# Patient Record
Sex: FEMALE | Race: WHITE | NOT HISPANIC OR LATINO | Employment: UNEMPLOYED | URBAN - NONMETROPOLITAN AREA
[De-identification: names, ages, dates, MRNs, and addresses within clinical notes are randomized per-mention and may not be internally consistent; named-entity substitution may affect disease eponyms.]

---

## 2017-07-10 ENCOUNTER — OFFICE VISIT (OUTPATIENT)
Dept: CARDIOLOGY | Facility: CLINIC | Age: 62
End: 2017-07-10

## 2017-07-10 VITALS
HEART RATE: 60 BPM | DIASTOLIC BLOOD PRESSURE: 64 MMHG | SYSTOLIC BLOOD PRESSURE: 114 MMHG | WEIGHT: 174 LBS | BODY MASS INDEX: 29.71 KG/M2 | HEIGHT: 64 IN

## 2017-07-10 DIAGNOSIS — I10 ESSENTIAL HYPERTENSION: ICD-10-CM

## 2017-07-10 DIAGNOSIS — R42 DIZZINESS: ICD-10-CM

## 2017-07-10 DIAGNOSIS — I34.0 NON-RHEUMATIC MITRAL REGURGITATION: ICD-10-CM

## 2017-07-10 DIAGNOSIS — R00.2 PALPITATION: Primary | ICD-10-CM

## 2017-07-10 PROCEDURE — 99213 OFFICE O/P EST LOW 20 MIN: CPT | Performed by: NURSE PRACTITIONER

## 2017-07-10 RX ORDER — RANITIDINE 150 MG/1
150 TABLET ORAL DAILY
COMMUNITY
End: 2020-08-06 | Stop reason: ALTCHOICE

## 2017-07-10 NOTE — PATIENT INSTRUCTIONS
Fat and Cholesterol Restricted Diet  High levels of fat and cholesterol in your blood may lead to various health problems, such as diseases of the heart, blood vessels, gallbladder, liver, and pancreas. Fats are concentrated sources of energy that come in various forms. Certain types of fat, including saturated fat, may be harmful in excess. Cholesterol is a substance needed by your body in small amounts. Your body makes all the cholesterol it needs. Excess cholesterol comes from the food you eat.  When you have high levels of cholesterol and saturated fat in your blood, health problems can develop because the excess fat and cholesterol will gather along the walls of your blood vessels, causing them to narrow. Choosing the right foods will help you control your intake of fat and cholesterol. This will help keep the levels of these substances in your blood within normal limits and reduce your risk of disease.  WHAT IS MY PLAN?  Your health care provider recommends that you:  · Get no more than __________ % of the total calories in your daily diet from fat.  · Limit your intake of saturated fat to less than ______% of your total calories each day.  · Limit the amount of cholesterol in your diet to less than _________mg per day.  WHAT TYPES OF FAT SHOULD I CHOOSE?  · Choose healthy fats more often. Choose monounsaturated and polyunsaturated fats, such as olive and canola oil, flaxseeds, walnuts, almonds, and seeds.  · Eat more omega-3 fats. Good choices include salmon, mackerel, sardines, tuna, flaxseed oil, and ground flaxseeds. Aim to eat fish at least two times a week.  · Limit saturated fats. Saturated fats are primarily found in animal products, such as meats, butter, and cream. Plant sources of saturated fats include palm oil, palm kernel oil, and coconut oil.  · Avoid foods with partially hydrogenated oils in them. These contain trans fats. Examples of foods that contain trans fats are stick margarine, some tub  "margarines, cookies, crackers, and other baked goods.  WHAT GENERAL GUIDELINES DO I NEED TO FOLLOW?  These guidelines for healthy eating will help you control your intake of fat and cholesterol:  · Check food labels carefully to identify foods with trans fats or high amounts of saturated fat.  · Fill one half of your plate with vegetables and green salads.  · Fill one fourth of your plate with whole grains. Look for the word \"whole\" as the first word in the ingredient list.  · Fill one fourth of your plate with lean protein foods.  · Limit fruit to two servings a day. Choose fruit instead of juice.  · Eat more foods that contain soluble fiber. Examples of foods that contain this type of fiber are apples, broccoli, carrots, beans, peas, and barley. Aim to get 20-30 g of fiber per day.  · Eat more home-cooked food and less restaurant, buffet, and fast food.  · Limit or avoid alcohol.  · Limit foods high in starch and sugar.  · Limit fried foods.  · Cook foods using methods other than frying. Baking, boiling, grilling, and broiling are all great options.  · Lose weight if you are overweight. Losing just 5-10% of your initial body weight can help your overall health and prevent diseases such as diabetes and heart disease.  WHAT FOODS CAN I EAT?  Grains  Whole grains, such as whole wheat or whole grain breads, crackers, cereals, and pasta. Unsweetened oatmeal, bulgur, barley, quinoa, or brown rice. Corn or whole wheat flour tortillas.  Vegetables  Fresh or frozen vegetables (raw, steamed, roasted, or grilled). Green salads.  Fruits  All fresh, canned (in natural juice), or frozen fruits.  Meat and Other Protein Products  Ground beef (85% or leaner), grass-fed beef, or beef trimmed of fat. Skinless chicken or turkey. Ground chicken or turkey. Pork trimmed of fat. All fish and seafood. Eggs. Dried beans, peas, or lentils. Unsalted nuts or seeds. Unsalted canned or dry beans.  Dairy  Low-fat dairy products, such as skim or " 1% milk, 2% or reduced-fat cheeses, low-fat ricotta or cottage cheese, or plain low-fat yogurt.  Fats and Oils  Tub margarines without trans fats. Light or reduced-fat mayonnaise and salad dressings. Avocado. Olive, canola, sesame, or safflower oils. Natural peanut or almond butter (choose ones without added sugar and oil).  The items listed above may not be a complete list of recommended foods or beverages. Contact your dietitian for more options.  WHAT FOODS ARE NOT RECOMMENDED?  Grains  White bread. White pasta. White rice. Cornbread. Bagels, pastries, and croissants. Crackers that contain trans fat.  Vegetables  White potatoes. Corn. Creamed or fried vegetables. Vegetables in a cheese sauce.  Fruits  Dried fruits. Canned fruit in light or heavy syrup. Fruit juice.  Meat and Other Protein Products  Fatty cuts of meat. Ribs, chicken wings, hulrey, sausage, bologna, salami, chitterlings, fatback, hot dogs, bratwurst, and packaged luncheon meats. Liver and organ meats.  Dairy  Whole or 2% milk, cream, half-and-half, and cream cheese. Whole milk cheeses. Whole-fat or sweetened yogurt. Full-fat cheeses. Nondairy creamers and whipped toppings. Processed cheese, cheese spreads, or cheese curds.  Sweets and Desserts  Corn syrup, sugars, honey, and molasses. Candy. Jam and jelly. Syrup. Sweetened cereals. Cookies, pies, cakes, donuts, muffins, and ice cream.  Fats and Oils  Butter, stick margarine, lard, shortening, ghee, or hurley fat. Coconut, palm kernel, or palm oils.  Beverages  Alcohol. Sweetened drinks (such as sodas, lemonade, and fruit drinks or punches).  The items listed above may not be a complete list of foods and beverages to avoid. Contact your dietitian for more information.     This information is not intended to replace advice given to you by your health care provider. Make sure you discuss any questions you have with your health care provider.     Document Released: 12/18/2006 Document Revised: 01/08/2016  Document Reviewed: 03/18/2015  [employer] Interactive Patient Education ©2017 [employer]

## 2017-07-10 NOTE — PROGRESS NOTES
"Chief Complaint   Patient presents with   • Follow-up     She states has had some dizziness, feels related to ears or possible virus she was getting.    • Palpitations     She states seems like palpitations are better, she states \"if takes medication does not have as often and not as hard\"   • Med Refill     Needs refills on cardiac medication-90 day.       Cardiac Complaints  Dizziness and palpitations      Subjective   Tawanna Montgomery is a 61 y.o. female with a history of asthma and allergies. She also has a history of palpitations which she has had on and off for the last few years. Holter showed a baseline sinus rhythm with highest rate of 114. Beta blockers were started and she underwent cardiac investigation. Echo showed normal EF, mild LVH, and moderate MR. She returns today for follow up and reports continued palpitations that she says have actually improved from prior.  She did have some dizziness some time back but she relates to a virus she was getting but this has now went away.  Labs she reports with PCP most recent LDL shows labs at 131 with HDL of 54, and vitamin D was low at 23.6, she is now on replacement.  Cardiac refills requested.      Cardiac History  Past Surgical History:   Procedure Laterality Date   • CARDIOVASCULAR STRESS TEST  07/30/2015    Stress-8min, 82%THR, 150/72, negative for ischemia   • CONVERTED (HISTORICAL) HOLTER  02/13/2015    Holter-baseline sinus, HR    • ECHO - CONVERTED  07/30/2015    Echo-Ef 60-65%, moderate MR, RVSP-20mmHg.   • EYE SURGERY         Current Outpatient Prescriptions   Medication Sig Dispense Refill   • Calcium-Magnesium-Vitamin D - MG-MG-UNIT tablet sustained-release 24 hour Take  by mouth.     • diphenhydrAMINE (BENADRYL) 25 mg capsule Take 25 mg by mouth as needed for itching.     • metoprolol tartrate (LOPRESSOR) 25 MG tablet Take 1 tablet by mouth 2 (Two) Times a Day. 180 tablet 3   • raNITIdine (ZANTAC) 150 MG tablet Take 150 mg by " "mouth Daily.       No current facility-administered medications for this visit.        Allegra-d [fexofenadine-pseudoephed er]; Macrobid [nitrofurantoin monohyd macro]; and Penicillins    Past Medical History:   Diagnosis Date   • Asthma    • Depression    • Osteopenia    • Palpitation    • Seasonal allergies    • Vitamin D deficiency        Social History     Social History   • Marital status:      Spouse name: N/A   • Number of children: N/A   • Years of education: N/A     Occupational History   • Not on file.     Social History Main Topics   • Smoking status: Former Smoker     Quit date: 1980   • Smokeless tobacco: Never Used   • Alcohol use No   • Drug use: No   • Sexual activity: Not on file     Other Topics Concern   • Not on file     Social History Narrative       Family History   Problem Relation Age of Onset   • Irregular heart beat Mother    • Prostate cancer Father    • Heart disease Other        Review of Systems   Constitution: Negative for malaise/fatigue and night sweats.   HENT: Negative for congestion and sore throat.    Cardiovascular: Negative for chest pain, dyspnea on exertion and near-syncope.   Respiratory: Negative for cough, shortness of breath and wheezing.    Musculoskeletal: Negative for arthritis and back pain.   Gastrointestinal: Negative for anorexia, flatus, heartburn, nausea and vomiting.   Genitourinary: Negative for dysuria, frequency, hesitancy and nocturia.   Neurological: Negative for dizziness, focal weakness, light-headedness and numbness.   Psychiatric/Behavioral: Negative for altered mental status and depression.       DiabetesNo  Thyroidnormal    Objective     /64 (BP Location: Right arm)  Pulse 60  Ht 64\" (162.6 cm)  Wt 174 lb (78.9 kg)  BMI 29.87 kg/m2    Physical Exam   Constitutional: She is oriented to person, place, and time. She appears well-developed and well-nourished.   HENT:   Head: Normocephalic and atraumatic.   Eyes: EOM are normal. Pupils are " equal, round, and reactive to light.   Neck: Normal range of motion. Neck supple.   Cardiovascular: Normal rate and regular rhythm.    Murmur heard.  Pulmonary/Chest: Effort normal and breath sounds normal.   Abdominal: Soft.   Musculoskeletal: Normal range of motion.   Neurological: She is alert and oriented to person, place, and time.   Skin: Skin is warm and dry.   Psychiatric: She has a normal mood and affect. Her behavior is normal.       Procedures    Assessment/Plan     HR and BP are both stable.  No new cardiac testing will be advised as no new cardiac concerns are voiced.  Cardiac refills sent.  Most recent labs reviewed show LDL of 131, we advised on low cholesterol diet and increasing walking regimen.  She states you will be following in regards.  We will continue follow ups on yearly basis unless problems arise for which she was encouraged to call for sooner appointment.        Problems Addressed this Visit        Cardiovascular and Mediastinum    Palpitation - Primary    HTN (hypertension)    Relevant Medications    metoprolol tartrate (LOPRESSOR) 25 MG tablet    Mitral regurgitation    Relevant Medications    metoprolol tartrate (LOPRESSOR) 25 MG tablet      Other Visit Diagnoses     Dizziness                  Electronically signed by SAUL uLcia July 10, 2017 10:47 AM

## 2018-07-06 NOTE — PROGRESS NOTES
"Chief Complaint   Patient presents with   • Follow-up     For cardiac management. Needs refills on cardiac meds for 90 days to [facility]. Labs per PCP about a year ago.    • Palpitations     Occasional palpitations. About the same as before.    • ASA     Does not take aspirin.        Subjective       Tawanna Montgomery is a 62 y.o. female with a history of asthma and allergies. She also has a history of palpitations which she has had on and off for the last few years. Holter showed a baseline sinus rhythm with highest rate of 114. Beta blockers were started and she underwent cardiac investigation. Echo showed normal EF, mild LVH, and moderate MR.  Today she comes to the office for a follow up visit. No cardiac concerns are voiced. She admits to palpitations, brief in nature, and no worse than before. She does admit to heartburn symptoms especially when \"stomach is empty\".     HPI     Cardiac History:    Past Surgical History:   Procedure Laterality Date   • CARDIOVASCULAR STRESS TEST  07/30/2015    Stress-8min, 82%THR, 150/72, negative for ischemia   • CONVERTED (HISTORICAL) HOLTER  02/13/2015    Holter-baseline sinus, HR    • ECHO - CONVERTED  07/30/2015    Echo-Ef 60-65%, moderate MR, RVSP-20mmHg.   • EYE SURGERY         Current Outpatient Prescriptions   Medication Sig Dispense Refill   • Calcium-Magnesium-Vitamin D - MG-MG-UNIT tablet sustained-release 24 hour Take  by mouth.     • diphenhydrAMINE (BENADRYL) 25 mg capsule Take 25 mg by mouth as needed for itching.     • fluticasone (FLONASE) 50 MCG/ACT nasal spray 2 sprays into each nostril Daily.     • metoprolol tartrate (LOPRESSOR) 25 MG tablet Take 1 tablet by mouth 2 (Two) Times a Day. 180 tablet 4   • raNITIdine (ZANTAC) 150 MG tablet Take 150 mg by mouth Daily.       No current facility-administered medications for this visit.        Allegra-d [fexofenadine-pseudoephed er]; Macrobid [nitrofurantoin monohyd macro]; and " "Penicillins    Past Medical History:   Diagnosis Date   • Asthma    • Depression    • Osteopenia    • Palpitation    • Seasonal allergies    • Vitamin D deficiency        Social History     Social History   • Marital status:      Spouse name: N/A   • Number of children: N/A   • Years of education: N/A     Occupational History   • Not on file.     Social History Main Topics   • Smoking status: Former Smoker     Quit date: 1980   • Smokeless tobacco: Never Used   • Alcohol use No   • Drug use: No   • Sexual activity: Not on file     Other Topics Concern   • Not on file     Social History Narrative   • No narrative on file       Family History   Problem Relation Age of Onset   • Irregular heart beat Mother    • Prostate cancer Father    • Heart disease Other        Review of Systems   Constitution: Negative for decreased appetite, diaphoresis, fever and weakness.   HENT: Negative for congestion, hoarse voice and nosebleeds.    Eyes: Negative for redness and visual disturbance.   Cardiovascular: Positive for palpitations. Negative for chest pain, leg swelling and near-syncope.   Respiratory: Negative for cough, shortness of breath and sleep disturbances due to breathing.    Endocrine: Negative for polydipsia, polyphagia and polyuria.   Hematologic/Lymphatic: Negative for bleeding problem. Does not bruise/bleed easily.   Skin: Negative for dry skin and itching.   Musculoskeletal: Negative for muscle cramps and myalgias.   Gastrointestinal: Positive for heartburn and nausea. Negative for abdominal pain and melena.   Genitourinary: Negative for dysuria and hematuria.   Neurological: Negative for dizziness and light-headedness.   Psychiatric/Behavioral: The patient does not have insomnia and is not nervous/anxious.         Objective     /68   Pulse 60   Ht 162.6 cm (64\")   Wt 79.8 kg (176 lb)   BMI 30.21 kg/m²     Physical Exam   Constitutional: She is oriented to person, place, and time. She appears " well-developed and well-nourished.   HENT:   Head: Normocephalic.   Eyes: Conjunctivae are normal. Pupils are equal, round, and reactive to light.   Neck: Normal range of motion. Neck supple.   Cardiovascular: Normal rate, regular rhythm, S1 normal, S2 normal and normal pulses.    Murmur heard.   Systolic murmur is present with a grade of 2/6   Pulmonary/Chest: Breath sounds normal. She has no wheezes. She has no rales.   Abdominal: Soft. Bowel sounds are normal.   Musculoskeletal: Normal range of motion. She exhibits no edema.   Neurological: She is alert and oriented to person, place, and time.   Skin: Skin is warm and dry.   Psychiatric: She has a normal mood and affect.        Procedures: none today        Assessment/Plan      Tawanna was seen today for follow-up, palpitations and asa.    Diagnoses and all orders for this visit:    Essential hypertension    Non-rheumatic mitral regurgitation    Palpitation    Asthma, unspecified asthma severity, unspecified whether complicated, unspecified whether persistent    Medication management    Other orders  -     metoprolol tartrate (LOPRESSOR) 25 MG tablet; Take 1 tablet by mouth 2 (Two) Times a Day.    Consider reassessment of H.Pylori,, which she has been treated for in the past. She will discuss with you. At this time, advised to continue Zantac. Diet for management of GERD information given to her.     Palpitations are brief and no worse. I did not recommend cardiac workup at this time. She will continue Lopressor twice a day and refills given. If significant palpitations occur she can take extra 1/2 tablet of Lopressor and understands to call the office.     She follows wit you for management of labs. Her insurance has high deductible and co-pays. Therefore, no labs advised at this time.     Patient's Body mass index is 30.21 kg/m². BMI is above normal parameters. Recommendations include: nutrition counseling. Diet for weight loss and heart healthy diet  information given. She management lipids by diet.     We will plan to see her annually. If any problems or concerns develop sooner, she understands to call.             Electronically signed by SAUL Jensen,  July 10, 2018 10:17 AM

## 2018-07-10 ENCOUNTER — OFFICE VISIT (OUTPATIENT)
Dept: CARDIOLOGY | Facility: CLINIC | Age: 63
End: 2018-07-10

## 2018-07-10 VITALS
DIASTOLIC BLOOD PRESSURE: 68 MMHG | SYSTOLIC BLOOD PRESSURE: 110 MMHG | HEART RATE: 60 BPM | HEIGHT: 64 IN | BODY MASS INDEX: 30.05 KG/M2 | WEIGHT: 176 LBS

## 2018-07-10 DIAGNOSIS — I34.0 NON-RHEUMATIC MITRAL REGURGITATION: ICD-10-CM

## 2018-07-10 DIAGNOSIS — R00.2 PALPITATION: ICD-10-CM

## 2018-07-10 DIAGNOSIS — I10 ESSENTIAL HYPERTENSION: Primary | ICD-10-CM

## 2018-07-10 DIAGNOSIS — Z79.899 MEDICATION MANAGEMENT: ICD-10-CM

## 2018-07-10 DIAGNOSIS — J45.909 ASTHMA, UNSPECIFIED ASTHMA SEVERITY, UNSPECIFIED WHETHER COMPLICATED, UNSPECIFIED WHETHER PERSISTENT: ICD-10-CM

## 2018-07-10 PROCEDURE — 99213 OFFICE O/P EST LOW 20 MIN: CPT | Performed by: NURSE PRACTITIONER

## 2018-07-10 RX ORDER — FLUTICASONE PROPIONATE 50 MCG
2 SPRAY, SUSPENSION (ML) NASAL DAILY PRN
COMMUNITY
End: 2023-01-30

## 2018-07-10 NOTE — PATIENT INSTRUCTIONS
Palpitations  A palpitation is the feeling that your heartbeat is irregular or is faster than normal. It may feel like your heart is fluttering or skipping a beat. Palpitations are usually not a serious problem. They may be caused by many things, including smoking, caffeine, alcohol, stress, and certain medicines. Although most causes of palpitations are not serious, palpitations can be a sign of a serious medical problem. In some cases, you may need further medical evaluation.  Follow these instructions at home:  Pay attention to any changes in your symptoms. Take these actions to help with your condition:  · Avoid the following:  ? Caffeinated coffee, tea, soft drinks, diet pills, and energy drinks.  ? Chocolate.  ? Alcohol.  · Do not use any tobacco products, such as cigarettes, chewing tobacco, and e-cigarettes. If you need help quitting, ask your health care provider.  · Try to reduce your stress and anxiety. Things that can help you relax include:  ? Yoga.  ? Meditation.  ? Physical activity, such as swimming, jogging, or walking.  ? Biofeedback. This is a method that helps you learn to use your mind to control things in your body, such as your heartbeats.  · Get plenty of rest and sleep.  · Take over-the-counter and prescription medicines only as told by your health care provider.  · Keep all follow-up visits as told by your health care provider. This is important.    Contact a health care provider if:  · You continue to have a fast or irregular heartbeat after 24 hours.  · Your palpitations occur more often.  Get help right away if:  · You have chest pain or shortness of breath.  · You have a severe headache.  · You feel dizzy or you faint.  This information is not intended to replace advice given to you by your health care provider. Make sure you discuss any questions you have with your health care provider.  Document Released: 12/15/2001 Document Revised: 05/22/2017 Document Reviewed: 09/01/2016  [employer]  Interactive Patient Education © 2018 [employer]    Food Choices for Gastroesophageal Reflux Disease, Adult  When you have gastroesophageal reflux disease (GERD), the foods you eat and your eating habits are very important. Choosing the right foods can help ease the discomfort of GERD. Consider working with a diet and nutrition specialist (dietitian) to help you make healthy food choices.  What general guidelines should I follow?  Eating plan  · Choose healthy foods low in fat, such as fruits, vegetables, whole grains, low-fat dairy products, and lean meat, fish, and poultry.  · Eat frequent, small meals instead of three large meals each day. Eat your meals slowly, in a relaxed setting. Avoid bending over or lying down until 2-3 hours after eating.  · Limit high-fat foods such as fatty meats or fried foods.  · Limit your intake of oils, butter, and shortening to less than 8 teaspoons each day.  · Avoid the following:  ? Foods that cause symptoms. These may be different for different people. Keep a food diary to keep track of foods that cause symptoms.  ? Alcohol.  ? Drinking large amounts of liquid with meals.  ? Eating meals during the 2-3 hours before bed.  · Cook foods using methods other than frying. This may include baking, grilling, or broiling.  Lifestyle    · Maintain a healthy weight. Ask your health care provider what weight is healthy for you. If you need to lose weight, work with your health care provider to do so safely.  · Exercise for at least 30 minutes on 5 or more days each week, or as told by your health care provider.  · Avoid wearing clothes that fit tightly around your waist and chest.  · Do not use any products that contain nicotine or tobacco, such as cigarettes and e-cigarettes. If you need help quitting, ask your health care provider.  · Sleep with the head of your bed raised. Use a wedge under the mattress or blocks under the bed frame to raise the head of the bed.  What foods are not  recommended?  The items listed may not be a complete list. Talk with your dietitian about what dietary choices are best for you.  Grains  Pastries or quick breads with added fat. French toast.  Vegetables  Deep fried vegetables. French fries. Any vegetables prepared with added fat. Any vegetables that cause symptoms. For some people this may include tomatoes and tomato products, chili peppers, onions and garlic, and horseradish.  Fruits  Any fruits prepared with added fat. Any fruits that cause symptoms. For some people this may include citrus fruits, such as oranges, grapefruit, pineapple, and maurilio.  Meats and other protein foods  High-fat meats, such as fatty beef or pork, hot dogs, ribs, ham, sausage, salami and hurley. Fried meat or protein, including fried fish and fried chicken. Nuts and nut butters.  Dairy  Whole milk and chocolate milk. Sour cream. Cream. Ice cream. Cream cheese. Milk shakes.  Beverages  Coffee and tea, with or without caffeine. Carbonated beverages. Sodas. Energy drinks. Fruit juice made with acidic fruits (such as orange or grapefruit). Tomato juice. Alcoholic drinks.  Fats and oils  Butter. Margarine. Shortening. Ghee.  Sweets and desserts  Chocolate and cocoa. Donuts.  Seasoning and other foods  Pepper. Peppermint and spearmint. Any condiments, herbs, or seasonings that cause symptoms. For some people, this may include davalos, hot sauce, or vinegar-based salad dressings.  Summary  · When you have gastroesophageal reflux disease (GERD), food and lifestyle choices are very important to help ease the discomfort of GERD.  · Eat frequent, small meals instead of three large meals each day. Eat your meals slowly, in a relaxed setting. Avoid bending over or lying down until 2-3 hours after eating.  · Limit high-fat foods such as fatty meat or fried foods.  This information is not intended to replace advice given to you by your health care provider. Make sure you discuss any questions you have  with your health care provider.  Document Released: 12/18/2006 Document Revised: 12/19/2017 Document Reviewed: 12/19/2017  [employer] Interactive Patient Education © 2018 [employer]    Calorie Counting for Weight Loss  Calories are units of energy. Your body needs a certain amount of calories from food to keep you going throughout the day. When you eat more calories than your body needs, your body stores the extra calories as fat. When you eat fewer calories than your body needs, your body burns fat to get the energy it needs.  Calorie counting means keeping track of how many calories you eat and drink each day. Calorie counting can be helpful if you need to lose weight. If you make sure to eat fewer calories than your body needs, you should lose weight. Ask your health care provider what a healthy weight is for you.  For calorie counting to work, you will need to eat the right number of calories in a day in order to lose a healthy amount of weight per week. A dietitian can help you determine how many calories you need in a day and will give you suggestions on how to reach your calorie goal.  · A healthy amount of weight to lose per week is usually 1-2 lb (0.5-0.9 kg). This usually means that your daily calorie intake should be reduced by 500-750 calories.  · Eating 1,200 - 1,500 calories per day can help most women lose weight.  · Eating 1,500 - 1,800 calories per day can help most men lose weight.    What is my plan?  My goal is to have __________ calories per day.  If I have this many calories per day, I should lose around __________ pounds per week.  What do I need to know about calorie counting?  In order to meet your daily calorie goal, you will need to:  · Find out how many calories are in each food you would like to eat. Try to do this before you eat.  · Decide how much of the food you plan to eat.  · Write down what you ate and how many calories it had. Doing this is called keeping a food log.    To  successfully lose weight, it is important to balance calorie counting with a healthy lifestyle that includes regular activity. Aim for 150 minutes of moderate exercise (such as walking) or 75 minutes of vigorous exercise (such as running) each week.  Where do I find calorie information?    The number of calories in a food can be found on a Nutrition Facts label. If a food does not have a Nutrition Facts label, try to look up the calories online or ask your dietitian for help.  Remember that calories are listed per serving. If you choose to have more than one serving of a food, you will have to multiply the calories per serving by the amount of servings you plan to eat. For example, the label on a package of bread might say that a serving size is 1 slice and that there are 90 calories in a serving. If you eat 1 slice, you will have eaten 90 calories. If you eat 2 slices, you will have eaten 180 calories.  How do I keep a food log?  Immediately after each meal, record the following information in your food log:  · What you ate. Don't forget to include toppings, sauces, and other extras on the food.  · How much you ate. This can be measured in cups, ounces, or number of items.  · How many calories each food and drink had.  · The total number of calories in the meal.    Keep your food log near you, such as in a small notebook in your pocket, or use a mobile tomeka or website. Some programs will calculate calories for you and show you how many calories you have left for the day to meet your goal.  What are some calorie counting tips?  · Use your calories on foods and drinks that will fill you up and not leave you hungry:  ? Some examples of foods that fill you up are nuts and nut butters, vegetables, lean proteins, and high-fiber foods like whole grains. High-fiber foods are foods with more than 5 g fiber per serving.  ? Drinks such as sodas, specialty coffee drinks, alcohol, and juices have a lot of calories, yet do not  "fill you up.  · Eat nutritious foods and avoid empty calories. Empty calories are calories you get from foods or beverages that do not have many vitamins or protein, such as candy, sweets, and soda. It is better to have a nutritious high-calorie food (such as an avocado) than a food with few nutrients (such as a bag of chips).  · Know how many calories are in the foods you eat most often. This will help you calculate calorie counts faster.  · Pay attention to calories in drinks. Low-calorie drinks include water and unsweetened drinks.  · Pay attention to nutrition labels for \"low fat\" or \"fat free\" foods. These foods sometimes have the same amount of calories or more calories than the full fat versions. They also often have added sugar, starch, or salt, to make up for flavor that was removed with the fat.  · Find a way of tracking calories that works for you. Get creative. Try different apps or programs if writing down calories does not work for you.  What are some portion control tips?  · Know how many calories are in a serving. This will help you know how many servings of a certain food you can have.  · Use a measuring cup to measure serving sizes. You could also try weighing out portions on a kitchen scale. With time, you will be able to estimate serving sizes for some foods.  · Take some time to put servings of different foods on your favorite plates, bowls, and cups so you know what a serving looks like.  · Try not to eat straight from a bag or box. Doing this can lead to overeating. Put the amount you would like to eat in a cup or on a plate to make sure you are eating the right portion.  · Use smaller plates, glasses, and bowls to prevent overeating.  · Try not to multitask (for example, watch TV or use your computer) while eating. If it is time to eat, sit down at a table and enjoy your food. This will help you to know when you are full. It will also help you to be aware of what you are eating and how much " you are eating.  What are tips for following this plan?  Reading food labels  · Check the calorie count compared to the serving size. The serving size may be smaller than what you are used to eating.  · Check the source of the calories. Make sure the food you are eating is high in vitamins and protein and low in saturated and trans fats.  Shopping  · Read nutrition labels while you shop. This will help you make healthy decisions before you decide to purchase your food.  · Make a grocery list and stick to it.  Cooking  · Try to cook your favorite foods in a healthier way. For example, try baking instead of frying.  · Use low-fat dairy products.  Meal planning  · Use more fruits and vegetables. Half of your plate should be fruits and vegetables.  · Include lean proteins like poultry and fish.  How do I count calories when eating out?  · Ask for smaller portion sizes.  · Consider sharing an entree and sides instead of getting your own entree.  · If you get your own entree, eat only half. Ask for a box at the beginning of your meal and put the rest of your entree in it so you are not tempted to eat it.  · If calories are listed on the menu, choose the lower calorie options.  · Choose dishes that include vegetables, fruits, whole grains, low-fat dairy products, and lean protein.  · Choose items that are boiled, broiled, grilled, or steamed. Stay away from items that are buttered, battered, fried, or served with cream sauce. Items labeled “crispy” are usually fried, unless stated otherwise.  · Choose water, low-fat milk, unsweetened iced tea, or other drinks without added sugar. If you want an alcoholic beverage, choose a lower calorie option such as a glass of wine or light beer.  · Ask for dressings, sauces, and syrups on the side. These are usually high in calories, so you should limit the amount you eat.  · If you want a salad, choose a garden salad and ask for grilled meats. Avoid extra toppings like hurley, cheese,  or fried items. Ask for the dressing on the side, or ask for olive oil and vinegar or lemon to use as dressing.  · Estimate how many servings of a food you are given. For example, a serving of cooked rice is ½ cup or about the size of half a baseball. Knowing serving sizes will help you be aware of how much food you are eating at restaurants. The list below tells you how big or small some common portion sizes are based on everyday objects:  ? 1 oz--4 stacked dice.  ? 3 oz--1 deck of cards.  ? 1 tsp--1 die.  ? 1 Tbsp--½ a ping-pong ball.  ? 2 Tbsp--1 ping-pong ball.  ? ½ cup--½ baseball.  ? 1 cup--1 baseball.  Summary  · Calorie counting means keeping track of how many calories you eat and drink each day. If you eat fewer calories than your body needs, you should lose weight.  · A healthy amount of weight to lose per week is usually 1-2 lb (0.5-0.9 kg). This usually means reducing your daily calorie intake by 500-750 calories.  · The number of calories in a food can be found on a Nutrition Facts label. If a food does not have a Nutrition Facts label, try to look up the calories online or ask your dietitian for help.  · Use your calories on foods and drinks that will fill you up, and not on foods and drinks that will leave you hungry.  · Use smaller plates, glasses, and bowls to prevent overeating.  This information is not intended to replace advice given to you by your health care provider. Make sure you discuss any questions you have with your health care provider.  Document Released: 12/18/2006 Document Revised: 11/17/2017 Document Reviewed: 11/17/2017  [employer] Interactive Patient Education © 2018 [employer]

## 2019-08-08 ENCOUNTER — DOCUMENTATION (OUTPATIENT)
Dept: CARDIOLOGY | Facility: CLINIC | Age: 64
End: 2019-08-08

## 2019-08-08 ENCOUNTER — OFFICE VISIT (OUTPATIENT)
Dept: CARDIOLOGY | Facility: CLINIC | Age: 64
End: 2019-08-08

## 2019-08-08 VITALS
SYSTOLIC BLOOD PRESSURE: 110 MMHG | HEIGHT: 64 IN | BODY MASS INDEX: 29.71 KG/M2 | WEIGHT: 174 LBS | DIASTOLIC BLOOD PRESSURE: 68 MMHG | HEART RATE: 60 BPM

## 2019-08-08 DIAGNOSIS — J45.909 ASTHMA, UNSPECIFIED ASTHMA SEVERITY, UNSPECIFIED WHETHER COMPLICATED, UNSPECIFIED WHETHER PERSISTENT: ICD-10-CM

## 2019-08-08 DIAGNOSIS — R00.2 PALPITATION: ICD-10-CM

## 2019-08-08 DIAGNOSIS — I34.0 NON-RHEUMATIC MITRAL REGURGITATION: ICD-10-CM

## 2019-08-08 DIAGNOSIS — E66.3 OVERWEIGHT: ICD-10-CM

## 2019-08-08 DIAGNOSIS — R01.1 HEART MURMUR: ICD-10-CM

## 2019-08-08 DIAGNOSIS — I10 ESSENTIAL HYPERTENSION: Primary | ICD-10-CM

## 2019-08-08 PROCEDURE — 99213 OFFICE O/P EST LOW 20 MIN: CPT | Performed by: NURSE PRACTITIONER

## 2019-08-08 RX ORDER — ERGOCALCIFEROL 1.25 MG/1
50000 CAPSULE ORAL WEEKLY
COMMUNITY
End: 2020-08-06 | Stop reason: ALTCHOICE

## 2019-08-08 RX ORDER — ESCITALOPRAM OXALATE 10 MG/1
10 TABLET ORAL DAILY
COMMUNITY

## 2019-08-08 NOTE — PROGRESS NOTES
"Chief Complaint   Patient presents with   • Follow-up     Cardiac management. Has stable B/P at home, she is now only taking Metoprolol daily instead of BID due to low B/P and HR.   • Palpitations     She states \"fluttering seems to last longer than before\". She has some days with no palpitations and some days more frequent.   • Lab     Has copy of most recent lab.   • Med Refill     Needs refills on Metoprolol-90 day.   • Aspirin     Patient does not take.       Cardiac Complaints  palpitations      Subjective   Tawanna Montgomery is a 64 y.o. female with palpitations, HTN, murmur, allergies, and asthma. Palpitations have been on and off for the last several years. Holter showed a baseline sinus rhythm with highest rate of 114. Beta blockers were started and she underwent cardiac investigation. Echo showed normal EF, mild LVH, and moderate MR.    She returns today for follow up and reports doing well.  She does admit to more palpitations that she states feels like a racing.  Patient reports she does eat a lot of chocolate but is not sure if it related.  She does report the race/flutter sometimes once a day or several times a day that she reports is self limiting and goes away after a few minutes. She does report only taking only taking metoprolol once daily as she does report heart rate getting too low and her blood pressure.  Labs done in February 2019:  H/H 14.3/44.3, AIC 5.4%, Na 143, K 4.3, GLU 95, HDL 50, , TRIG 82, VIT D 27.0, VIT B12 504, ALT 20, AST 20, TSH 2.140.  Refills of metoprolol requested for 90 day supply.        Cardiac History  Past Surgical History:   Procedure Laterality Date   • CARDIOVASCULAR STRESS TEST  07/30/2015    Stress-8min, 82%THR, 150/72, negative for ischemia   • CONVERTED (HISTORICAL) HOLTER  02/13/2015    Holter-baseline sinus, HR    • ECHO - CONVERTED  07/30/2015    Echo-Ef 60-65%, moderate MR, RVSP-20mmHg.   • EYE SURGERY         Current Outpatient Medications "   Medication Sig Dispense Refill   • Calcium-Magnesium-Vitamin D - MG-MG-UNIT tablet sustained-release 24 hour Take  by mouth.     • diphenhydrAMINE (BENADRYL) 25 mg capsule Take 25 mg by mouth as needed for itching.     • escitalopram (LEXAPRO) 10 MG tablet Take 10 mg by mouth Daily.     • fluticasone (FLONASE) 50 MCG/ACT nasal spray 2 sprays into the nostril(s) as directed by provider Daily As Needed.     • metoprolol tartrate (LOPRESSOR) 25 MG tablet 1 tablet in AM and may use additional 1/2 to 1 tablet PM for palpitations 180 tablet 4   • raNITIdine (ZANTAC) 150 MG tablet Take 150 mg by mouth Daily.     • vitamin D (ERGOCALCIFEROL) 38061 units capsule capsule Take 50,000 Units by mouth 1 (One) Time Per Week.       No current facility-administered medications for this visit.        Allegra-d [fexofenadine-pseudoephed er]; Macrobid [nitrofurantoin monohyd macro]; and Penicillins    Past Medical History:   Diagnosis Date   • Asthma    • Depression    • Osteopenia    • Palpitation    • Seasonal allergies    • Vitamin D deficiency        Social History     Socioeconomic History   • Marital status:      Spouse name: Not on file   • Number of children: Not on file   • Years of education: Not on file   • Highest education level: Not on file   Tobacco Use   • Smoking status: Former Smoker     Last attempt to quit:      Years since quittin.6   • Smokeless tobacco: Never Used   Substance and Sexual Activity   • Alcohol use: No   • Drug use: No       Family History   Problem Relation Age of Onset   • Irregular heart beat Mother    • Prostate cancer Father    • Heart disease Other        Review of Systems   Constitution: Negative for weakness and malaise/fatigue.   Cardiovascular: Positive for palpitations. Negative for chest pain, claudication, dyspnea on exertion, irregular heartbeat, leg swelling, near-syncope, orthopnea and syncope.   Respiratory: Negative for cough, shortness of breath and  "wheezing.    Musculoskeletal: Negative for back pain, joint pain and joint swelling.   Gastrointestinal: Negative for anorexia, heartburn, nausea and vomiting.   Genitourinary: Negative for dysuria, hematuria, hesitancy and nocturia.   Neurological: Negative for dizziness, light-headedness and loss of balance.   Psychiatric/Behavioral: Negative for depression and memory loss. The patient is not nervous/anxious.            Objective     /68 (BP Location: Left arm)   Pulse 60   Ht 162.6 cm (64.02\")   Wt 78.9 kg (174 lb)   BMI 29.85 kg/m²     Physical Exam   Constitutional: She is oriented to person, place, and time. She appears well-developed and well-nourished.   HENT:   Head: Normocephalic and atraumatic.   Eyes: EOM are normal. Pupils are equal, round, and reactive to light.   Neck: Normal range of motion. Neck supple.   Cardiovascular: Normal rate and regular rhythm.   Murmur heard.  Pulmonary/Chest: Effort normal and breath sounds normal.   Abdominal: Soft.   Musculoskeletal: Normal range of motion.   Neurological: She is alert and oriented to person, place, and time.   Skin: Skin is warm and dry.   Psychiatric: She has a normal mood and affect. Her behavior is normal.       Procedures    Assessment/Plan     HR is stable today.  Blood pressure as well. HTN well managed on current.  Palpitations have increased since last visit but she has decreased her metoprolol to once daily and admits to more chocolate intake.  Patient was advised to continue with once daily use as she reports lower blood pressure and heart rate at home, she was urged to use an extra 1/2 to 1 tablet as needed for palpitations and to limit caffeine consumption.  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  BMI noted at 29.85, she has lost 2 pounds since last visit.  Most recent labs reviewed with patient showed " low vitamin D which is being replaced by your office.  LDL noted at 116 with HDL of 50. Patient urged on dietary changes and walking regimen.  Patient urged on good cardiac diet with limited caloric intake, carbs, and activity as tolerated advised. 1 year follow up advised or sooner if needed.          Problems Addressed this Visit        Cardiovascular and Mediastinum    Palpitation    HTN (hypertension) - Primary    Relevant Medications    metoprolol tartrate (LOPRESSOR) 25 MG tablet    Mitral regurgitation    Relevant Medications    metoprolol tartrate (LOPRESSOR) 25 MG tablet       Respiratory    Asthma      Other Visit Diagnoses     Heart murmur        Overweight              Patient's Body mass index is 29.85 kg/m². BMI is above normal parameters. Recommendations include: nutrition counseling.                Electronically signed by SAUL Lucia August 9, 2019 9:35 AM

## 2020-08-06 ENCOUNTER — OFFICE VISIT (OUTPATIENT)
Dept: CARDIOLOGY | Facility: CLINIC | Age: 65
End: 2020-08-06

## 2020-08-06 VITALS
BODY MASS INDEX: 29.06 KG/M2 | DIASTOLIC BLOOD PRESSURE: 70 MMHG | HEART RATE: 60 BPM | HEIGHT: 64 IN | SYSTOLIC BLOOD PRESSURE: 110 MMHG | WEIGHT: 170.2 LBS | TEMPERATURE: 98.4 F

## 2020-08-06 DIAGNOSIS — R06.02 SHORTNESS OF BREATH: ICD-10-CM

## 2020-08-06 DIAGNOSIS — I34.0 NONRHEUMATIC MITRAL VALVE REGURGITATION: Primary | ICD-10-CM

## 2020-08-06 DIAGNOSIS — R00.2 PALPITATION: ICD-10-CM

## 2020-08-06 DIAGNOSIS — I10 ESSENTIAL HYPERTENSION: ICD-10-CM

## 2020-08-06 PROCEDURE — 99213 OFFICE O/P EST LOW 20 MIN: CPT | Performed by: NURSE PRACTITIONER

## 2020-08-06 NOTE — PROGRESS NOTES
Chief Complaint   Patient presents with   • Follow-up     Cardiac management.   • Lab     Last labs in July per PCP.   • Palpitations     Has occasional flutter, same as before.   • Med Refill     Needs refills on Metoprolol-90 day.     Subjective       Tawanna Montgomery is a 65 y.o. female with palpitations, HTN, murmur, allergies, and asthma. Palpitations have been on and off for the last several years. Holter showed a baseline sinus rhythm with highest rate of 114. Beta blockers were started and she underwent cardiac investigation. Echo showed normal EF, mild LVH, and moderate MR.    She returns today for her yearly follow-up visit.  She feels well.  Denies chest pain.  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  She does experience mild dyspnea with significant exertion.  No dizziness or syncope.  Palpitations remain intermittent and infrequent.  She is tolerating low-dose metoprolol.  Labs followed by PCP.         Cardiac History:    Past Surgical History:   Procedure Laterality Date   • CARDIOVASCULAR STRESS TEST  07/30/2015    Stress-8min, 82%THR, 150/72, negative for ischemia   • CONVERTED (HISTORICAL) HOLTER  02/13/2015    Holter-baseline sinus, HR    • ECHO - CONVERTED  07/30/2015    Echo-Ef 60-65%, moderate MR, RVSP-20mmHg.   • EYE SURGERY       Current Outpatient Medications   Medication Sig Dispense Refill   • Calcium-Magnesium-Vitamin D - MG-MG-UNIT tablet sustained-release 24 hour Take  by mouth Daily.     • diphenhydrAMINE (BENADRYL) 25 mg capsule Take 25 mg by mouth as needed for itching.     • escitalopram (LEXAPRO) 10 MG tablet Take 10 mg by mouth Daily As Needed.     • fluticasone (FLONASE) 50 MCG/ACT nasal spray 2 sprays into the nostril(s) as directed by provider Daily As Needed.     • metoprolol tartrate (LOPRESSOR) 25 MG tablet 1 tablet in AM and may use additional 1/2 to 1 tablet PM for palpitations 180 tablet 4     No  "current facility-administered medications for this visit.      Allegra-d [fexofenadine-pseudoephed er]; Macrobid [nitrofurantoin monohyd macro]; and Penicillins    Past Medical History:   Diagnosis Date   • Asthma    • Depression    • Hx of cholecystectomy    • Osteopenia    • Palpitation    • Seasonal allergies    • Vitamin D deficiency      Social History     Socioeconomic History   • Marital status:      Spouse name: Not on file   • Number of children: Not on file   • Years of education: Not on file   • Highest education level: Not on file   Tobacco Use   • Smoking status: Former Smoker     Last attempt to quit: 1980     Years since quittin.6   • Smokeless tobacco: Never Used   Substance and Sexual Activity   • Alcohol use: No   • Drug use: No     Family History   Problem Relation Age of Onset   • Irregular heart beat Mother    • Prostate cancer Father    • Heart disease Other      Review of Systems   Constitution: Negative for decreased appetite and malaise/fatigue.   HENT: Negative.    Eyes: Negative for blurred vision.   Cardiovascular: Positive for dyspnea on exertion and palpitations. Negative for chest pain, leg swelling and syncope.   Respiratory: Negative for shortness of breath and sleep disturbances due to breathing.    Endocrine: Negative.    Hematologic/Lymphatic: Negative for bleeding problem. Does not bruise/bleed easily.   Skin: Negative.    Musculoskeletal: Negative for falls and myalgias.   Gastrointestinal: Negative for abdominal pain, heartburn and melena.   Genitourinary: Negative for hematuria.   Neurological: Negative for dizziness and light-headedness.   Psychiatric/Behavioral: Negative for altered mental status.   Allergic/Immunologic: Negative.       Objective     /70 (BP Location: Right arm)   Pulse 60   Temp 98.4 °F (36.9 °C)   Ht 162.6 cm (64.02\")   Wt 77.2 kg (170 lb 3.2 oz)   BMI 29.20 kg/m²     Physical Exam   Constitutional: She is oriented to person, place, " and time. She appears well-developed and well-nourished. No distress.   HENT:   Head: Normocephalic.   Eyes: Pupils are equal, round, and reactive to light.   Neck: Normal range of motion.   Cardiovascular: Normal rate, regular rhythm, S1 normal, S2 normal and intact distal pulses.   Murmur heard.   Systolic murmur is present with a grade of 2/6.  Pulmonary/Chest: Effort normal and breath sounds normal. No respiratory distress.   Abdominal: Soft. Bowel sounds are normal.   Musculoskeletal: Normal range of motion. She exhibits no edema.   Neurological: She is alert and oriented to person, place, and time.   Skin: Skin is warm and dry. She is not diaphoretic.   Psychiatric: She has a normal mood and affect.   Nursing note and vitals reviewed.     Procedures          Problem List Items Addressed This Visit        Cardiovascular and Mediastinum    Palpitation    Overview     Well-controlled with metoprolol.  Continue the same         HTN (hypertension)    Overview     Well-controlled with low-dose beta-blocker.  Continue the same.  Low-sodium.  Weight loss for BMI closer to 25.         Relevant Medications    metoprolol tartrate (LOPRESSOR) 25 MG tablet    Mitral regurgitation - Primary    Overview     Moderate MR by echocardiogram in 2015.  She has mild dyspnea on exertion.  Will repeat her echocardiogram for routine surveillance.         Relevant Medications    metoprolol tartrate (LOPRESSOR) 25 MG tablet    Other Relevant Orders    Adult Transthoracic Echo Complete W/ Cont if Necessary Per Protocol      Other Visit Diagnoses     Shortness of breath        Relevant Orders    Adult Transthoracic Echo Complete W/ Cont if Necessary Per Protocol         Further recommendations to follow echocardiogram.  Refill sent for metoprolol.  Heart healthy diet regular exercise recommended.  Follow-up in 1 year or sooner if needed.    Patient's Body mass index is 29.2 kg/m². BMI is above normal parameters. Recommendations include:  nutrition counseling.               Electronically signed by SAUL Mendez,  August 6, 2020 11:40

## 2020-09-21 ENCOUNTER — HOSPITAL ENCOUNTER (OUTPATIENT)
Dept: CARDIOLOGY | Facility: HOSPITAL | Age: 65
Discharge: HOME | End: 2020-09-21
Admitting: NURSE PRACTITIONER

## 2020-09-21 DIAGNOSIS — R06.02 SHORTNESS OF BREATH: ICD-10-CM

## 2020-09-21 DIAGNOSIS — I34.0 NONRHEUMATIC MITRAL VALVE REGURGITATION: ICD-10-CM

## 2020-09-21 LAB
BH CV ECHO MEAS - ACS: 1.9 CM
BH CV ECHO MEAS - AO MAX PG: 7.7 MMHG
BH CV ECHO MEAS - AO MEAN PG: 4 MMHG
BH CV ECHO MEAS - AO ROOT AREA (BSA CORRECTED): 1.4
BH CV ECHO MEAS - AO ROOT AREA: 5.3 CM^2
BH CV ECHO MEAS - AO ROOT DIAM: 2.6 CM
BH CV ECHO MEAS - AO V2 MAX: 139 CM/SEC
BH CV ECHO MEAS - AO V2 MEAN: 95.6 CM/SEC
BH CV ECHO MEAS - AO V2 VTI: 34.6 CM
BH CV ECHO MEAS - BSA(HAYCOCK): 1.9 M^2
BH CV ECHO MEAS - BSA: 1.8 M^2
BH CV ECHO MEAS - BZI_BMI: 29.2 KILOGRAMS/M^2
BH CV ECHO MEAS - BZI_METRIC_HEIGHT: 162.6 CM
BH CV ECHO MEAS - BZI_METRIC_WEIGHT: 77.1 KG
BH CV ECHO MEAS - EDV(CUBED): 61.2 ML
BH CV ECHO MEAS - EDV(MOD-SP4): 78 ML
BH CV ECHO MEAS - EDV(TEICH): 67.5 ML
BH CV ECHO MEAS - EF(CUBED): 49.4 %
BH CV ECHO MEAS - EF(MOD-SP4): 76 %
BH CV ECHO MEAS - EF(TEICH): 42.1 %
BH CV ECHO MEAS - ESV(CUBED): 31 ML
BH CV ECHO MEAS - ESV(MOD-SP4): 18.7 ML
BH CV ECHO MEAS - ESV(TEICH): 39.1 ML
BH CV ECHO MEAS - FS: 20.3 %
BH CV ECHO MEAS - IVS/LVPW: 1.2
BH CV ECHO MEAS - IVSD: 0.99 CM
BH CV ECHO MEAS - LA DIMENSION: 3.7 CM
BH CV ECHO MEAS - LA/AO: 1.4
BH CV ECHO MEAS - LV DIASTOLIC VOL/BSA (35-75): 42.7 ML/M^2
BH CV ECHO MEAS - LV IVRT: 0.1 SEC
BH CV ECHO MEAS - LV MASS(C)D: 109.7 GRAMS
BH CV ECHO MEAS - LV MASS(C)DI: 60.1 GRAMS/M^2
BH CV ECHO MEAS - LV SYSTOLIC VOL/BSA (12-30): 10.2 ML/M^2
BH CV ECHO MEAS - LVIDD: 3.9 CM
BH CV ECHO MEAS - LVIDS: 3.1 CM
BH CV ECHO MEAS - LVLD AP4: 8.5 CM
BH CV ECHO MEAS - LVLS AP4: 5.9 CM
BH CV ECHO MEAS - LVOT AREA (M): 2.5 CM^2
BH CV ECHO MEAS - LVOT AREA: 2.5 CM^2
BH CV ECHO MEAS - LVOT DIAM: 1.8 CM
BH CV ECHO MEAS - LVPWD: 0.84 CM
BH CV ECHO MEAS - MV A MAX VEL: 123 CM/SEC
BH CV ECHO MEAS - MV DEC SLOPE: 348 CM/SEC^2
BH CV ECHO MEAS - MV E MAX VEL: 103 CM/SEC
BH CV ECHO MEAS - MV E/A: 0.84
BH CV ECHO MEAS - RVDD: 2.5 CM
BH CV ECHO MEAS - SI(AO): 100.6 ML/M^2
BH CV ECHO MEAS - SI(CUBED): 16.5 ML/M^2
BH CV ECHO MEAS - SI(MOD-SP4): 32.5 ML/M^2
BH CV ECHO MEAS - SI(TEICH): 15.6 ML/M^2
BH CV ECHO MEAS - SV(AO): 183.7 ML
BH CV ECHO MEAS - SV(CUBED): 30.2 ML
BH CV ECHO MEAS - SV(MOD-SP4): 59.3 ML
BH CV ECHO MEAS - SV(TEICH): 28.4 ML
MAXIMAL PREDICTED HEART RATE: 154 BPM
STRESS TARGET HR: 131 BPM

## 2020-09-21 PROCEDURE — 93306 TTE W/DOPPLER COMPLETE: CPT | Performed by: INTERNAL MEDICINE

## 2020-09-21 PROCEDURE — 93306 TTE W/DOPPLER COMPLETE: CPT

## 2021-08-05 ENCOUNTER — OFFICE VISIT (OUTPATIENT)
Dept: CARDIOLOGY | Facility: CLINIC | Age: 66
End: 2021-08-05

## 2021-08-05 VITALS
WEIGHT: 178.2 LBS | HEART RATE: 60 BPM | DIASTOLIC BLOOD PRESSURE: 70 MMHG | SYSTOLIC BLOOD PRESSURE: 138 MMHG | HEIGHT: 64 IN | BODY MASS INDEX: 30.42 KG/M2

## 2021-08-05 DIAGNOSIS — R60.0 BILATERAL LEG EDEMA: ICD-10-CM

## 2021-08-05 DIAGNOSIS — E66.9 OBESITY (BMI 30.0-34.9): ICD-10-CM

## 2021-08-05 DIAGNOSIS — J45.909 ASTHMA, UNSPECIFIED ASTHMA SEVERITY, UNSPECIFIED WHETHER COMPLICATED, UNSPECIFIED WHETHER PERSISTENT: ICD-10-CM

## 2021-08-05 DIAGNOSIS — I34.0 NONRHEUMATIC MITRAL VALVE REGURGITATION: ICD-10-CM

## 2021-08-05 DIAGNOSIS — R00.2 PALPITATION: ICD-10-CM

## 2021-08-05 DIAGNOSIS — I10 ESSENTIAL HYPERTENSION: Primary | ICD-10-CM

## 2021-08-05 PROCEDURE — 99213 OFFICE O/P EST LOW 20 MIN: CPT | Performed by: NURSE PRACTITIONER

## 2021-08-05 NOTE — PROGRESS NOTES
Chief Complaint   Patient presents with   • Follow-up     Cardiac management   • Lab     Has copy of most recent labs.   • Edema     Has occasional swelling in legs, relieved with elevation.   • Med Refill     Needs refills on Metoprolol-90 day.       Cardiac Complaints  lower extremity edema      Subjective   Tawanna Montgomery is a 66 y.o. female with palpitations, HTN, murmur, allergies, and asthma. Palpitations have been on and off for the last several years. Holter showed a baseline sinus rhythm with highest rate of 114. Beta blockers were started and she underwent cardiac investigation. Echo showed normal EF, mild LVH, and moderate MR.     She returns today for her yearly follow-up visit.  She feels well.  Denies chest pain.  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 She does report some edema on occasion, but admits with elevation, edema resolves. Labs brought with her today from Feb 2021 showed:  HH 14.6/43.7, BUN 15, Creatinine 0.83, GFR 74, Na 142, K 4.7, AST 22, ALT 24, AIC 5.3%, TSH 1.810, VIT D 27.4.  Refills of metoprolol requested.           Cardiac History  Past Surgical History:   Procedure Laterality Date   • CARDIOVASCULAR STRESS TEST  07/30/2015    Stress-8min, 82%THR, 150/72, negative for ischemia   • CONVERTED (HISTORICAL) HOLTER  02/13/2015    Holter-baseline sinus, HR    • ECHO - CONVERTED  07/30/2015    Echo-Ef 60-65%, moderate MR, RVSP-20mmHg.   • ECHO - CONVERTED  09/21/2020    EF 60%. LA- 3.7 Cm. Mild MR   • EYE SURGERY         Current Outpatient Medications   Medication Sig Dispense Refill   • Calcium-Magnesium-Vitamin D - MG-MG-UNIT tablet sustained-release 24 hour Take  by mouth Daily As Needed.     • diphenhydrAMINE (BENADRYL) 25 mg capsule Take 25 mg by mouth as needed for itching.     • escitalopram (LEXAPRO) 10 MG tablet Take 5 mg by mouth Daily.     • fluticasone (FLONASE) 50 MCG/ACT nasal spray 2 sprays into  the nostril(s) as directed by provider Daily As Needed.     • metoprolol tartrate (LOPRESSOR) 25 MG tablet 1 tablet in AM and may use additional 1/2 to 1 tablet PM for palpitations 180 tablet 4     No current facility-administered medications for this visit.       Allegra-d [fexofenadine-pseudoephed er], Macrobid [nitrofurantoin monohyd macro], and Penicillins    Past Medical History:   Diagnosis Date   • Asthma    • Depression    • Hx of cholecystectomy    • Osteopenia    • Palpitation    • Seasonal allergies    • Vitamin D deficiency        Social History     Socioeconomic History   • Marital status:      Spouse name: Not on file   • Number of children: Not on file   • Years of education: Not on file   • Highest education level: Not on file   Tobacco Use   • Smoking status: Former Smoker     Quit date:      Years since quittin.6   • Smokeless tobacco: Never Used   Vaping Use   • Vaping Use: Never used   Substance and Sexual Activity   • Alcohol use: No   • Drug use: No       Family History   Problem Relation Age of Onset   • Irregular heart beat Mother    • Prostate cancer Father    • Heart disease Other        Review of Systems   Constitutional: Negative for malaise/fatigue and night sweats.   Cardiovascular: Positive for leg swelling. Negative for chest pain, claudication, dyspnea on exertion, irregular heartbeat, near-syncope, orthopnea, palpitations and syncope.   Respiratory: Negative for cough, shortness of breath and wheezing.    Musculoskeletal: Positive for stiffness. Negative for back pain and joint pain.   Gastrointestinal: Negative for anorexia, heartburn, melena, nausea and vomiting.   Genitourinary: Negative for dysuria, hematuria, hesitancy and nocturia.   Neurological: Negative for dizziness, light-headedness and loss of balance.   Psychiatric/Behavioral: Negative for depression and memory loss. The patient is not nervous/anxious.            Objective     /70 (BP Location:  "Right arm)   Pulse 60   Ht 162.6 cm (64.02\")   Wt 80.8 kg (178 lb 3.2 oz)   BMI 30.57 kg/m²     Constitutional:       Appearance: Not in distress.   Eyes:      Pupils: Pupils are equal, round, and reactive to light.   HENT:      Nose: Nose normal.   Pulmonary:      Effort: Pulmonary effort is normal.      Breath sounds: Normal breath sounds.   Cardiovascular:      PMI at left midclavicular line. Normal rate. Regular rhythm.      Murmurs: There is a systolic murmur.   Edema:     Peripheral edema present.  Abdominal:      Palpations: Abdomen is soft.   Musculoskeletal: Normal range of motion.      Cervical back: Normal range of motion and neck supple. Skin:     General: Skin is warm and dry.   Neurological:      Mental Status: Oriented to person, place and time.         Procedures    Assessment/Plan     Palpitations:  Well managed on current. Same metoprolol therapy continued.     HTN:  Blood pressure stable, but SBP slightly high, patient urged on log at home, and to limit sodium intake. Same BB therapy advised.     Edema:  Limited sodium and leg elevation advised. She will call if symptoms should worsen, so prn diuretic may be used.    Refills per request.    Cardiac status:  Stable. No new concerns voiced. No repeat workup advised.     BMI noted at 30.57, good cardiac diet with limited carbs, calories, and walking regimen advised.     6 month follow up scheduled or sooner if needed.       Problems Addressed this Visit        Cardiac and Vasculature    Palpitation    HTN (hypertension) - Primary    Relevant Medications    metoprolol tartrate (LOPRESSOR) 25 MG tablet    Mitral regurgitation    Relevant Medications    metoprolol tartrate (LOPRESSOR) 25 MG tablet       Pulmonary and Pneumonias    Asthma      Other Visit Diagnoses     Bilateral leg edema        Obesity (BMI 30.0-34.9)          Diagnoses       Codes Comments    Essential hypertension    -  Primary ICD-10-CM: I10  ICD-9-CM: 401.9     Palpitation     " ICD-10-CM: R00.2  ICD-9-CM: 785.1     Nonrheumatic mitral valve regurgitation     ICD-10-CM: I34.0  ICD-9-CM: 424.0     Bilateral leg edema     ICD-10-CM: R60.0  ICD-9-CM: 782.3     Asthma, unspecified asthma severity, unspecified whether complicated, unspecified whether persistent     ICD-10-CM: J45.909  ICD-9-CM: 493.90     Obesity (BMI 30.0-34.9)     ICD-10-CM: E66.9  ICD-9-CM: 278.00           Patient's Body mass index is 30.57 kg/m². indicating that she is obese. Good cardiac diet with limited carbs, calories, and activity as tolerated advised.           Electronically signed by SAUL Lucia August 5, 2021 12:33 EDT

## 2022-07-19 ENCOUNTER — OFFICE VISIT (OUTPATIENT)
Dept: CARDIOLOGY | Facility: CLINIC | Age: 67
End: 2022-07-19

## 2022-07-19 VITALS
HEART RATE: 68 BPM | DIASTOLIC BLOOD PRESSURE: 82 MMHG | BODY MASS INDEX: 30.05 KG/M2 | SYSTOLIC BLOOD PRESSURE: 142 MMHG | HEIGHT: 64 IN | WEIGHT: 176 LBS

## 2022-07-19 DIAGNOSIS — I34.0 NONRHEUMATIC MITRAL VALVE REGURGITATION: ICD-10-CM

## 2022-07-19 DIAGNOSIS — F43.21 GRIEF: ICD-10-CM

## 2022-07-19 DIAGNOSIS — I10 PRIMARY HYPERTENSION: Primary | ICD-10-CM

## 2022-07-19 DIAGNOSIS — R60.0 EDEMA LEG: ICD-10-CM

## 2022-07-19 DIAGNOSIS — R00.2 PALPITATION: ICD-10-CM

## 2022-07-19 DIAGNOSIS — E66.9 OBESITY (BMI 30.0-34.9): ICD-10-CM

## 2022-07-19 PROCEDURE — 99213 OFFICE O/P EST LOW 20 MIN: CPT | Performed by: NURSE PRACTITIONER

## 2022-07-19 RX ORDER — MELOXICAM 15 MG/1
15 TABLET ORAL DAILY
COMMUNITY

## 2022-07-19 NOTE — PROGRESS NOTES
Chief Complaint   Patient presents with   • Follow-up     For cardiac management. Patient is not on aspirin. Last lab work was done in January 2022 per PCP, not in chart. States that she has had some palpitations.    • Med Refill     Needs refills on metoprolol. 90 day supplies to [facility]. Patient went over medications verbally.        Cardiac Complaints  palpitations      Subjective   Tawanna Montgomery is a 66 y.o. female with palpitations, HTN, murmur, allergies, and asthma. Palpitations have been on and off for the last several years. Holter showed a baseline sinus rhythm with highest rate of 114. Beta blockers were started and she underwent cardiac investigation. Echo showed normal EF, mild LVH, and moderate MR.    She remains active at home without concerns. She does have some rare palpitations, but denies any CP, SOA, dizziness, or syncope. Left knee pain noted today, she is to see ortho soon in regards. Labs with PCP, most recently done in Jan, no copy available. Refills requested.       Cardiac History  Past Surgical History:   Procedure Laterality Date   • CARDIOVASCULAR STRESS TEST  07/30/2015    Stress-8min, 82%THR, 150/72, negative for ischemia   • CONVERTED (HISTORICAL) HOLTER  02/13/2015    Holter-baseline sinus, HR    • ECHO - CONVERTED  07/30/2015    Echo-Ef 60-65%, moderate MR, RVSP-20mmHg.   • ECHO - CONVERTED  09/21/2020    EF 60%. LA- 3.7 Cm. Mild MR   • EYE SURGERY         Current Outpatient Medications   Medication Sig Dispense Refill   • Calcium-Magnesium-Vitamin D - MG-MG-UNIT tablet sustained-release 24 hour Take 1 tablet by mouth Daily As Needed.     • diphenhydrAMINE (BENADRYL) 25 mg capsule Take 25 mg by mouth as needed for itching.     • escitalopram (LEXAPRO) 10 MG tablet Take 10 mg by mouth Daily.     • fluticasone (FLONASE) 50 MCG/ACT nasal spray 2 sprays into the nostril(s) as directed by provider Daily As Needed.     • meloxicam (MOBIC) 15 MG tablet Take 15 mg by  "mouth Daily.     • metoprolol tartrate (LOPRESSOR) 25 MG tablet 1 tablet in AM and may use additional 1/2 to 1 tablet PM for palpitations 180 tablet 4     No current facility-administered medications for this visit.       Allegra-d [fexofenadine-pseudoephed er], Macrobid [nitrofurantoin monohyd macro], and Penicillins    Past Medical History:   Diagnosis Date   • Asthma    • Depression    • Hx of cholecystectomy    • Osteopenia    • Palpitation    • Seasonal allergies    • Vitamin D deficiency        Social History     Socioeconomic History   • Marital status:    Tobacco Use   • Smoking status: Former Smoker     Quit date:      Years since quittin.5   • Smokeless tobacco: Never Used   Vaping Use   • Vaping Use: Never used   Substance and Sexual Activity   • Alcohol use: No   • Drug use: No       Family History   Problem Relation Age of Onset   • Irregular heart beat Mother    • Prostate cancer Father    • Heart disease Other        Review of Systems   Constitutional: Negative for malaise/fatigue and night sweats.   Cardiovascular: Negative for chest pain, claudication, dyspnea on exertion, irregular heartbeat, leg swelling, near-syncope, orthopnea, palpitations and syncope.   Respiratory: Negative for cough, shortness of breath and wheezing.    Musculoskeletal: Negative for back pain, joint pain and stiffness.   Gastrointestinal: Negative for anorexia, heartburn, melena, nausea and vomiting.   Genitourinary: Negative for dysuria, hematuria, hesitancy and nocturia.   Neurological: Negative for dizziness, headaches and light-headedness.   Psychiatric/Behavioral: Positive for depression. Negative for memory loss. The patient is nervous/anxious.            Objective     /82 (BP Location: Left arm)   Pulse 68   Ht 162.6 cm (64.02\")   Wt 79.8 kg (176 lb)   BMI 30.20 kg/m²     Constitutional:       Appearance: Not in distress.   Eyes:      Pupils: Pupils are equal, round, and reactive to light. "   HENT:      Nose: Nose normal.   Pulmonary:      Effort: Pulmonary effort is normal.      Breath sounds: Normal breath sounds.   Cardiovascular:      PMI at left midclavicular line. Normal rate. Regular rhythm.      Murmurs: There is a systolic murmur.   Abdominal:      Palpations: Abdomen is soft.   Musculoskeletal: Normal range of motion.      Cervical back: Normal range of motion and neck supple. Skin:     General: Skin is warm and dry.   Neurological:      Mental Status: Alert and oriented to person, place and time.         Procedures         Diagnoses and all orders for this visit:    1. Primary hypertension (Primary)    2. Palpitation    3. Nonrheumatic mitral valve regurgitation    4. Edema leg    5. Grief    6. Obesity (BMI 30.0-34.9)    Other orders  -     metoprolol tartrate (LOPRESSOR) 25 MG tablet; 1 tablet in AM and may use additional 1/2 to 1 tablet PM for palpitations  Dispense: 180 tablet; Refill: 4           Palpitations: Managed with beta blocker therapy. She admits palpitations worse over last two months after death of her . She was urged to continue with BB in AM and extra 1/2 PM. Limited caffeine advised.      HTN:  Blood pressure stable, but mildly elevated. She was urged increase in BB to 1 tablet AM and 1/2 to 1 tablet PM.     Edema:  Improved. She will call if symptoms should worsen, so prn diuretic may be used. Limited sodium advised.     Cardiac status:  Stable. We discussed repeat workup since length of time since last workup, she would like to wait to for a bit. If she should need clearance for knee surgery, she will call so further workup may be scheduled.     Refills per request.    Condolences given on the recent death of her .      BMI noted at 30.20, good cardiac diet with limited carbs, calories, and walking regimen advised.      6 month follow up scheduled or sooner if needed.          Problems Addressed this Visit        Cardiac and Vasculature    Palpitation     HTN (hypertension) - Primary    Relevant Medications    metoprolol tartrate (LOPRESSOR) 25 MG tablet    Mitral regurgitation    Relevant Medications    metoprolol tartrate (LOPRESSOR) 25 MG tablet      Other Visit Diagnoses     Edema leg        Grief        Obesity (BMI 30.0-34.9)          Diagnoses       Codes Comments    Primary hypertension    -  Primary ICD-10-CM: I10  ICD-9-CM: 401.9     Palpitation     ICD-10-CM: R00.2  ICD-9-CM: 785.1     Nonrheumatic mitral valve regurgitation     ICD-10-CM: I34.0  ICD-9-CM: 424.0     Edema leg     ICD-10-CM: R60.0  ICD-9-CM: 782.3     Grief     ICD-10-CM: F43.21  ICD-9-CM: 309.0     Obesity (BMI 30.0-34.9)     ICD-10-CM: E66.9  ICD-9-CM: 278.00                 Electronically signed by Ev Howell, SAUL July 19, 2022 14:20 EDT

## 2023-01-30 ENCOUNTER — OFFICE VISIT (OUTPATIENT)
Dept: CARDIOLOGY | Facility: CLINIC | Age: 68
End: 2023-01-30
Payer: MEDICARE

## 2023-01-30 VITALS
SYSTOLIC BLOOD PRESSURE: 122 MMHG | WEIGHT: 184.4 LBS | HEIGHT: 64 IN | DIASTOLIC BLOOD PRESSURE: 72 MMHG | HEART RATE: 56 BPM | BODY MASS INDEX: 31.48 KG/M2

## 2023-01-30 DIAGNOSIS — I10 PRIMARY HYPERTENSION: ICD-10-CM

## 2023-01-30 DIAGNOSIS — R00.2 PALPITATION: Primary | ICD-10-CM

## 2023-01-30 DIAGNOSIS — E66.9 OBESITY (BMI 30.0-34.9): ICD-10-CM

## 2023-01-30 DIAGNOSIS — E78.00 ELEVATED LDL CHOLESTEROL LEVEL: ICD-10-CM

## 2023-01-30 PROCEDURE — 99214 OFFICE O/P EST MOD 30 MIN: CPT | Performed by: NURSE PRACTITIONER

## 2023-01-30 RX ORDER — PANTOPRAZOLE SODIUM 40 MG/1
40 TABLET, DELAYED RELEASE ORAL DAILY
COMMUNITY
End: 2023-01-30 | Stop reason: SDUPTHER

## 2023-01-30 RX ORDER — ERGOCALCIFEROL 1.25 MG/1
50000 CAPSULE ORAL WEEKLY
COMMUNITY

## 2023-01-30 RX ORDER — PANTOPRAZOLE SODIUM 40 MG/1
40 TABLET, DELAYED RELEASE ORAL DAILY
Qty: 90 TABLET | Refills: 2 | Status: SHIPPED | OUTPATIENT
Start: 2023-01-30

## 2023-01-30 NOTE — PROGRESS NOTES
Chief Complaint   Patient presents with   • Follow-up     Pt is here for cardiac follow up.  Pt reports a few palpitations since her LOV.  She denies CP, dizziness or SOB.  Pt does not take a daily ASA.     • Med Refill     Pt request 90 day refills to be [facility].  Medications were verified by med bottles.     • Lab Work     Pt states their last labs were 1/20/23- pt brought in copy.         Cardiac Complaints  palpitations      Subjective   Tawanna Montgomery is a 67 y.o. female with palpitations, HTN, murmur, allergies, and asthma. Palpitations have been on and off for the last several years. Holter showed a baseline sinus rhythm with highest rate of 114. Beta blockers were started and she underwent cardiac investigation. Echo showed normal EF, mild LVH, and moderate MR.  At last visit in 2022, she had been having more anxiety after the loss of her  and slightly more palpitations noted.     She returns today for follow up and admits she has been doing better emotionally after the death of her . She does admit to rare palpitations, but states only happen on rare occasion. She denies any CP, dizziness, SOA, or syncope. Labs done with PCP on 1/2023 showed: HH 14.4/42.8, BUN 14, Creatinine 0.86, Na 144, K 4.4, AST 22, ALT 16, TRIG 82, HDL 52, , TSH 2.530, VIT D 28.6, now on VIT D replacement. Refills requested.         Cardiac History  Past Surgical History:   Procedure Laterality Date   • CARDIOVASCULAR STRESS TEST  07/30/2015    Stress-8min, 82%THR, 150/72, negative for ischemia   • CONVERTED (HISTORICAL) HOLTER  02/13/2015    Holter-baseline sinus, HR    • ECHO - CONVERTED  07/30/2015    Echo-Ef 60-65%, moderate MR, RVSP-20mmHg.   • ECHO - CONVERTED  09/21/2020    EF 60%. LA- 3.7 Cm. Mild MR   • EYE SURGERY         Current Outpatient Medications   Medication Sig Dispense Refill   • escitalopram (LEXAPRO) 10 MG tablet Take 10 mg by mouth Daily.     • meloxicam (MOBIC) 15 MG tablet  Take 15 mg by mouth Daily.     • metoprolol tartrate (LOPRESSOR) 25 MG tablet 1 tablet in AM and may use additional 1/2 to 1 tablet PM for palpitations 180 tablet 2   • pantoprazole (PROTONIX) 40 MG EC tablet Take 1 tablet by mouth Daily. 90 tablet 2   • vitamin D (ERGOCALCIFEROL) 1.25 MG (19134 UT) capsule capsule Take 50,000 Units by mouth 1 (One) Time Per Week.       No current facility-administered medications for this visit.       Allegra-d [fexofenadine-pseudoephed er], Macrobid [nitrofurantoin monohyd macro], and Penicillins    Past Medical History:   Diagnosis Date   • Asthma    • Depression    • Hx of cholecystectomy    • Osteopenia    • Palpitation    • Seasonal allergies    • Vitamin D deficiency        Social History     Socioeconomic History   • Marital status:    Tobacco Use   • Smoking status: Former     Types: Cigarettes     Quit date:      Years since quittin.1   • Smokeless tobacco: Never   Vaping Use   • Vaping Use: Never used   Substance and Sexual Activity   • Alcohol use: No   • Drug use: No       Family History   Problem Relation Age of Onset   • Irregular heart beat Mother    • Prostate cancer Father    • Heart disease Other        Review of Systems   Constitutional: Negative for malaise/fatigue and night sweats.   Cardiovascular: Positive for palpitations. Negative for chest pain, claudication, dyspnea on exertion, irregular heartbeat, leg swelling, near-syncope, orthopnea and syncope.   Respiratory: Negative for cough, shortness of breath and wheezing.    Musculoskeletal: Positive for stiffness. Negative for back pain and joint pain.   Gastrointestinal: Negative for anorexia, heartburn, nausea and vomiting.   Genitourinary: Negative for dysuria, hematuria, hesitancy and nocturia.   Neurological: Negative for dizziness, headaches and light-headedness.   Psychiatric/Behavioral: Negative for depression and memory loss. The patient is not nervous/anxious.            Objective  "    /72 (BP Location: Left arm, Patient Position: Sitting)   Pulse 56   Ht 162.6 cm (64\")   Wt 83.6 kg (184 lb 6.4 oz)   BMI 31.65 kg/m²     Constitutional:       Appearance: Not in distress.   Eyes:      Pupils: Pupils are equal, round, and reactive to light.   HENT:      Nose: Nose normal.   Pulmonary:      Effort: Pulmonary effort is normal.      Breath sounds: Normal breath sounds.   Cardiovascular:      PMI at left midclavicular line. Bradycardia present. Regular rhythm.      Murmurs: There is a systolic murmur.   Abdominal:      Palpations: Abdomen is soft.   Musculoskeletal: Normal range of motion.      Cervical back: Normal range of motion and neck supple. Skin:     General: Skin is warm and dry.   Neurological:      Mental Status: Alert.         Procedures         Diagnoses and all orders for this visit:    1. Palpitation (Primary)    2. Primary hypertension    3. Elevated LDL cholesterol level    4. Obesity (BMI 30.0-34.9)    Other orders  -     metoprolol tartrate (LOPRESSOR) 25 MG tablet; 1 tablet in AM and may use additional 1/2 to 1 tablet PM for palpitations  Dispense: 180 tablet; Refill: 2  -     pantoprazole (PROTONIX) 40 MG EC tablet; Take 1 tablet by mouth Daily.  Dispense: 90 tablet; Refill: 2             Palpitations: Managed with beta blocker therapy. She admits palpitations still noted rarely, but improved from prior. She was urged to continue with BB in AM and extra 1/2 PM. Limited caffeine advised.      HTN:  Blood pressure stable, continue lopressor at the same. Limited sodium urged.      Cardiac status:  Stable. No repeat workup will be recommended. If symptoms should occur, she will call so testing may be advised.     Hyperlipidemia: LDL elevated at 125. Dietary changes urged, along with red yeast rice and apple cider vinegar encouraged.      Refills per request.     BMI noted at 31.65, good cardiac diet with limited carbs, calories, and walking regimen advised.      6 month " follow up scheduled or sooner if needed.           Electronically signed by SAUL Lucia January 30, 2023 12:15 EST

## 2023-01-30 NOTE — LETTER
January 30, 2023     Tawanna Bustillo MD  [address] [facility] [address]    Patient: Tawanna Montgomery   YOB: 1955   Date of Visit: 1/30/2023       Dear Tawanna Bustillo MD    Tawanna Montgomery was in my office today. Below is a copy of my note.    If you have questions, please do not hesitate to call me. I look forward to following Tawanna along with you.         Sincerely,        Ev Howell, SAUL        CC: No Recipients    Chief Complaint   Patient presents with   • Follow-up     Pt is here for cardiac follow up.  Pt reports a few palpitations since her LOV.  She denies CP, dizziness or SOB.  Pt does not take a daily ASA.     • Med Refill     Pt request 90 day refills to be sent to [facility].  Medications were verified by med bottles.     • Lab Work     Pt states their last labs were 1/20/23- pt brought in copy.         Cardiac Complaints  palpitations      Subjective    Tawanna Montgomery is a 67 y.o. female with palpitations, HTN, murmur, allergies, and asthma. Palpitations have been on and off for the last several years. Holter showed a baseline sinus rhythm with highest rate of 114. Beta blockers were started and she underwent cardiac investigation. Echo showed normal EF, mild LVH, and moderate MR.  At last visit in 2022, she had been having more anxiety after the loss of her  and slightly more palpitations noted.     She returns today for follow up and admits she has been doing better emotionally after the death of her . She does admit to rare palpitations, but states only happen on rare occasion. She denies any CP, dizziness, SOA, or syncope. Labs done with PCP on 1/2023 showed: HH 14.4/42.8, BUN 14, Creatinine 0.86, Na 144, K 4.4, AST 22, ALT 16, TRIG 82, HDL 52, , TSH 2.530, VIT D 28.6, now on VIT D replacement. Refills requested.         Cardiac History  Past Surgical History:   Procedure Laterality Date   • CARDIOVASCULAR STRESS TEST  07/30/2015     Stress-8min, 82%THR, 150/72, negative for ischemia   • CONVERTED (HISTORICAL) HOLTER  2015    Holter-baseline sinus, HR    • ECHO - CONVERTED  2015    Echo-Ef 60-65%, moderate MR, RVSP-20mmHg.   • ECHO - CONVERTED  2020    EF 60%. LA- 3.7 Cm. Mild MR   • EYE SURGERY         Current Outpatient Medications   Medication Sig Dispense Refill   • escitalopram (LEXAPRO) 10 MG tablet Take 10 mg by mouth Daily.     • meloxicam (MOBIC) 15 MG tablet Take 15 mg by mouth Daily.     • metoprolol tartrate (LOPRESSOR) 25 MG tablet 1 tablet in AM and may use additional 1/2 to 1 tablet PM for palpitations 180 tablet 2   • pantoprazole (PROTONIX) 40 MG EC tablet Take 1 tablet by mouth Daily. 90 tablet 2   • vitamin D (ERGOCALCIFEROL) 1.25 MG (00485 UT) capsule capsule Take 50,000 Units by mouth 1 (One) Time Per Week.       No current facility-administered medications for this visit.       Allegra-d [fexofenadine-pseudoephed er], Macrobid [nitrofurantoin monohyd macro], and Penicillins    Past Medical History:   Diagnosis Date   • Asthma    • Depression    • Hx of cholecystectomy    • Osteopenia    • Palpitation    • Seasonal allergies    • Vitamin D deficiency        Social History     Socioeconomic History   • Marital status:    Tobacco Use   • Smoking status: Former     Types: Cigarettes     Quit date:      Years since quittin.1   • Smokeless tobacco: Never   Vaping Use   • Vaping Use: Never used   Substance and Sexual Activity   • Alcohol use: No   • Drug use: No       Family History   Problem Relation Age of Onset   • Irregular heart beat Mother    • Prostate cancer Father    • Heart disease Other        Review of Systems   Constitutional: Negative for malaise/fatigue and night sweats.   Cardiovascular: Positive for palpitations. Negative for chest pain, claudication, dyspnea on exertion, irregular heartbeat, leg swelling, near-syncope, orthopnea and syncope.   Respiratory: Negative for  "cough, shortness of breath and wheezing.    Musculoskeletal: Positive for stiffness. Negative for back pain and joint pain.   Gastrointestinal: Negative for anorexia, heartburn, nausea and vomiting.   Genitourinary: Negative for dysuria, hematuria, hesitancy and nocturia.   Neurological: Negative for dizziness, headaches and light-headedness.   Psychiatric/Behavioral: Negative for depression and memory loss. The patient is not nervous/anxious.            Objective      /72 (BP Location: Left arm, Patient Position: Sitting)   Pulse 56   Ht 162.6 cm (64\")   Wt 83.6 kg (184 lb 6.4 oz)   BMI 31.65 kg/m²     Constitutional:       Appearance: Not in distress.   Eyes:      Pupils: Pupils are equal, round, and reactive to light.   HENT:      Nose: Nose normal.   Pulmonary:      Effort: Pulmonary effort is normal.      Breath sounds: Normal breath sounds.   Cardiovascular:      PMI at left midclavicular line. Bradycardia present. Regular rhythm.      Murmurs: There is a systolic murmur.   Abdominal:      Palpations: Abdomen is soft.   Musculoskeletal: Normal range of motion.      Cervical back: Normal range of motion and neck supple. Skin:     General: Skin is warm and dry.   Neurological:      Mental Status: Alert.         Procedures        Diagnoses and all orders for this visit:    1. Palpitation (Primary)    2. Primary hypertension    3. Elevated LDL cholesterol level    4. Obesity (BMI 30.0-34.9)    Other orders  -     metoprolol tartrate (LOPRESSOR) 25 MG tablet; 1 tablet in AM and may use additional 1/2 to 1 tablet PM for palpitations  Dispense: 180 tablet; Refill: 2  -     pantoprazole (PROTONIX) 40 MG EC tablet; Take 1 tablet by mouth Daily.  Dispense: 90 tablet; Refill: 2            Palpitations: Managed with beta blocker therapy. She admits palpitations still noted rarely, but improved from prior. She was urged to continue with BB in AM and extra 1/2 PM. Limited caffeine advised.      HTN:  Blood " pressure stable, continue lopressor at the same. Limited sodium urged.      Cardiac status:  Stable. No repeat workup will be recommended. If symptoms should occur, she will call so testing may be advised.     Hyperlipidemia: LDL elevated at 125. Dietary changes urged, along with red yeast rice and apple cider vinegar encouraged.      Refills per request.     BMI noted at 31.65, good cardiac diet with limited carbs, calories, and walking regimen advised.      6 month follow up scheduled or sooner if needed.           Electronically signed by SAUL Lucia January 30, 2023 12:15 EST

## 2023-03-23 ENCOUNTER — TELEPHONE (OUTPATIENT)
Dept: CARDIOLOGY | Facility: CLINIC | Age: 68
End: 2023-03-23
Payer: MEDICARE

## 2023-03-23 NOTE — TELEPHONE ENCOUNTER
Received fax from Dr. Joseph for cardiac clearance for patient to have a left total knee arthroplasty. According to our records, I do not see where patient is on any blood thinners or has had any stenting.       Fax [phone]  Attn: Swathi

## 2023-08-07 ENCOUNTER — OFFICE VISIT (OUTPATIENT)
Dept: CARDIOLOGY | Facility: CLINIC | Age: 68
End: 2023-08-07
Payer: MEDICARE

## 2023-08-07 VITALS
SYSTOLIC BLOOD PRESSURE: 130 MMHG | HEART RATE: 64 BPM | BODY MASS INDEX: 31.65 KG/M2 | DIASTOLIC BLOOD PRESSURE: 70 MMHG | WEIGHT: 185.4 LBS | HEIGHT: 64 IN

## 2023-08-07 DIAGNOSIS — I34.0 NONRHEUMATIC MITRAL VALVE REGURGITATION: ICD-10-CM

## 2023-08-07 DIAGNOSIS — J45.909 ASTHMA, UNSPECIFIED ASTHMA SEVERITY, UNSPECIFIED WHETHER COMPLICATED, UNSPECIFIED WHETHER PERSISTENT: ICD-10-CM

## 2023-08-07 DIAGNOSIS — I10 PRIMARY HYPERTENSION: ICD-10-CM

## 2023-08-07 DIAGNOSIS — R00.2 PALPITATION: Primary | ICD-10-CM

## 2023-08-07 PROCEDURE — 1160F RVW MEDS BY RX/DR IN RCRD: CPT | Performed by: NURSE PRACTITIONER

## 2023-08-07 PROCEDURE — 99213 OFFICE O/P EST LOW 20 MIN: CPT | Performed by: NURSE PRACTITIONER

## 2023-08-07 PROCEDURE — 3078F DIAST BP <80 MM HG: CPT | Performed by: NURSE PRACTITIONER

## 2023-08-07 PROCEDURE — 1159F MED LIST DOCD IN RCRD: CPT | Performed by: NURSE PRACTITIONER

## 2023-08-07 PROCEDURE — 3075F SYST BP GE 130 - 139MM HG: CPT | Performed by: NURSE PRACTITIONER

## 2023-08-07 NOTE — PROGRESS NOTES
Chief Complaint   Patient presents with    Follow-up     Cardiac management       Subjective       Tawanna Montgomery is a 68 y.o. female with palpitations, HTN, murmur, allergies, and asthma. Palpitations have been on and off for the last several years. Holter showed a baseline sinus rhythm with highest rate of 114. Beta blockers were started and she underwent cardiac investigation. Echo showed normal EF, mild LVH, and moderate MR.     In March 2023 she underwent knee replacement surgery without complication.     Today she returns to the office for follow-up visit.  She has completed physical therapy and now maintaining her normal daily activity that issue.  Blood pressures been stable.  She admits palpitations have subsided since the beginning of the year.  Most often the winter months are when she is more symptomatic most likely attributed to stress and anxiety.    Cardiac History:    Past Surgical History:   Procedure Laterality Date    CARDIOVASCULAR STRESS TEST  07/30/2015    Stress-8min, 82%THR, 150/72, negative for ischemia    CONVERTED (HISTORICAL) HOLTER  02/13/2015    Holter-baseline sinus, HR     ECHO - CONVERTED  07/30/2015    Echo-Ef 60-65%, moderate MR, RVSP-20mmHg.    ECHO - CONVERTED  09/21/2020    EF 60%. LA- 3.7 Cm. Mild MR    EYE SURGERY         Current Outpatient Medications   Medication Sig Dispense Refill    escitalopram (LEXAPRO) 10 MG tablet Take 10 mg by mouth Daily.      meloxicam (MOBIC) 15 MG tablet Take 15 mg by mouth Daily.      metoprolol tartrate (LOPRESSOR) 25 MG tablet 1 tablet in AM and may use additional 1/2 to 1 tablet PM for palpitations 180 tablet 2    pantoprazole (PROTONIX) 40 MG EC tablet Take 1 tablet by mouth Daily. 90 tablet 2    vitamin D (ERGOCALCIFEROL) 1.25 MG (71035 UT) capsule capsule Take 50,000 Units by mouth 1 (One) Time Per Week.       No current facility-administered medications for this visit.       Allegra-d [fexofenadine-pseudoephed er], Macrobid  "[nitrofurantoin monohyd macro], and Penicillins    Past Medical History:   Diagnosis Date    Asthma     Depression     Hx of cholecystectomy     Osteopenia     Palpitation     Seasonal allergies     Vitamin D deficiency        Social History     Socioeconomic History    Marital status:    Tobacco Use    Smoking status: Former     Types: Cigarettes     Quit date:      Years since quittin.6    Smokeless tobacco: Never   Vaping Use    Vaping Use: Never used   Substance and Sexual Activity    Alcohol use: No    Drug use: No       Family History   Problem Relation Age of Onset    Irregular heart beat Mother     Prostate cancer Father     Heart disease Other        Review of Systems   Cardiovascular:  Negative for chest pain, dyspnea on exertion, leg swelling, near-syncope, palpitations and paroxysmal nocturnal dyspnea.   Respiratory:  Negative for shortness of breath and sleep disturbances due to breathing.    Skin:  Negative for color change.   Musculoskeletal:  Positive for joint pain.   Gastrointestinal:  Negative for change in bowel habit.   Genitourinary:  Negative for dysuria and hematuria.   Neurological:  Negative for dizziness and weakness.   Psychiatric/Behavioral:  Negative for suicidal ideas. The patient is nervous/anxious.       BP Readings from Last 5 Encounters:   23 122/72   22 142/82   21 138/70   20 110/70   19 110/68       Wt Readings from Last 5 Encounters:   23 83.6 kg (184 lb 6.4 oz)   22 79.8 kg (176 lb)   21 80.8 kg (178 lb 3.2 oz)   20 77.2 kg (170 lb 3.2 oz)   19 78.9 kg (174 lb)       Objective     2023: HH 14.4/42.8, BUN 14, Creatinine 0.86, Na 144, K 4.4, AST 22, ALT 16, TRIG 82, HDL 52, , TSH 2.530, VIT D 28.6,     Ht 162.6 cm (64\")   BMI 31.65 kg/mý     Vitals and nursing note reviewed.   Constitutional:       Appearance: Healthy appearance. Not in distress.   Eyes:      Conjunctiva/sclera: Conjunctivae " normal.      Pupils: Pupils are equal, round, and reactive to light.   HENT:      Head: Normocephalic.   Pulmonary:      Effort: Pulmonary effort is normal.      Breath sounds: Normal breath sounds.   Cardiovascular:      PMI at left midclavicular line. Normal rate. Regular rhythm.      Murmurs: There is no murmur.   Edema:     Peripheral edema absent.   Abdominal:      General: Bowel sounds are normal.      Palpations: Abdomen is soft.   Musculoskeletal:      Cervical back: Normal range of motion and neck supple. Skin:     General: Skin is warm and dry.   Neurological:      Mental Status: Alert, oriented to person, place, and time and oriented to person, place and time.        Procedures: None today         Assessment & Plan   Diagnoses and all orders for this visit:    1. Palpitation (Primary)    2. Primary hypertension    3. Nonrheumatic mitral valve regurgitation    4. Asthma, unspecified asthma severity, unspecified whether complicated, unspecified whether persistent      Hypertension/palpitations  -BP controlled  -Heart rate and rhythm normal  -Currently palpitations denied  -Informational handout on palpitations and triggers to avoid provided  -Continue Lopressor 1 tablet in the morning and one half in the evening.  Can take 1/2 tablet as needed for palpitations    Hyperlipidemia  -Labs per your office, please copy recent results.    Cardiac murmur  -Clinically stable  -No repeat testing warranted    BMI 31  -Diet and weight loss efforts with goal BMI goal around 25 encouraged.    Follow up in 6months, if continues to do well will consider annual visits after.

## 2024-03-28 ENCOUNTER — OFFICE VISIT (OUTPATIENT)
Dept: CARDIOLOGY | Facility: CLINIC | Age: 69
End: 2024-03-28
Payer: MEDICARE

## 2024-03-28 VITALS
DIASTOLIC BLOOD PRESSURE: 72 MMHG | HEART RATE: 64 BPM | HEIGHT: 64 IN | SYSTOLIC BLOOD PRESSURE: 142 MMHG | WEIGHT: 191.2 LBS | BODY MASS INDEX: 32.64 KG/M2

## 2024-03-28 DIAGNOSIS — E66.9 OBESITY (BMI 30.0-34.9): ICD-10-CM

## 2024-03-28 DIAGNOSIS — I10 PRIMARY HYPERTENSION: Primary | ICD-10-CM

## 2024-03-28 DIAGNOSIS — I34.0 NONRHEUMATIC MITRAL VALVE REGURGITATION: ICD-10-CM

## 2024-03-28 DIAGNOSIS — R07.89 CHEST PRESSURE: ICD-10-CM

## 2024-03-28 DIAGNOSIS — E78.00 ELEVATED LDL CHOLESTEROL LEVEL: ICD-10-CM

## 2024-03-28 PROCEDURE — 3077F SYST BP >= 140 MM HG: CPT | Performed by: NURSE PRACTITIONER

## 2024-03-28 PROCEDURE — 99214 OFFICE O/P EST MOD 30 MIN: CPT | Performed by: NURSE PRACTITIONER

## 2024-03-28 PROCEDURE — 1160F RVW MEDS BY RX/DR IN RCRD: CPT | Performed by: NURSE PRACTITIONER

## 2024-03-28 PROCEDURE — 3078F DIAST BP <80 MM HG: CPT | Performed by: NURSE PRACTITIONER

## 2024-03-28 PROCEDURE — 1159F MED LIST DOCD IN RCRD: CPT | Performed by: NURSE PRACTITIONER

## 2024-03-28 RX ORDER — ERGOCALCIFEROL 1.25 MG/1
CAPSULE ORAL
Qty: 6 CAPSULE | Refills: 3 | Status: SHIPPED | OUTPATIENT
Start: 2024-03-28

## 2024-03-28 RX ORDER — CLINDAMYCIN HYDROCHLORIDE 300 MG/1
300 CAPSULE ORAL 3 TIMES DAILY
COMMUNITY

## 2024-03-28 NOTE — PROGRESS NOTES
"Chief Complaint   Patient presents with    Follow-up     Cardiac management . Has  random episodes of chest pressure  \" feel like bra too tight \"    LABS     Has copy of current labs 3-3-2024 in door    Med Refill     No refills needed today. PCP filled last prescriptions        Subjective       Tawanna Montgomery is a 68 y.o. female with palpitations, HTN, murmur, allergies, and asthma. Palpitations have been on and off for the last several years. Holter showed a baseline sinus rhythm with highest rate of 114. Beta blockers were started and she underwent cardiac investigation. Echo showed normal EF, mild LVH, and moderate MR.      In March 2023 she underwent knee replacement surgery without complication.     Today she returns to the office for follow-up visit.  She admits to being more sedentary post knee surgery and during the winter months.  Sometimes while sitting she experience pressure in lower chest/epigastric area that feels \"like a bra was too tight\".  No recent change in medication management noted.  She denies heartburn.    Cardiac History:    Past Surgical History:   Procedure Laterality Date    CARDIOVASCULAR STRESS TEST  07/30/2015    Stress-8min, 82%THR, 150/72, negative for ischemia    CONVERTED (HISTORICAL) HOLTER  02/13/2015    Holter-baseline sinus, HR     ECHO - CONVERTED  07/30/2015    Echo-Ef 60-65%, moderate MR, RVSP-20mmHg.    ECHO - CONVERTED  09/21/2020    EF 60%. LA- 3.7 Cm. Mild MR    EYE SURGERY         Current Outpatient Medications   Medication Sig Dispense Refill    clindamycin (CLEOCIN) 300 MG capsule Take 1 capsule by mouth 3 (Three) Times a Day. Take 2 hours prior to dental work      escitalopram (LEXAPRO) 10 MG tablet Take 1 tablet by mouth Daily.      meloxicam (MOBIC) 15 MG tablet Take 1 tablet by mouth As Needed.      metoprolol tartrate (LOPRESSOR) 25 MG tablet 1 tablet in AM and may use additional 1/2 to 1 tablet PM for palpitations 180 tablet 2    pantoprazole (PROTONIX) " CHEST PAIN SINCE LAST NIGHT , PT ON 40 MG EC tablet Take 1 tablet by mouth Daily. 90 tablet 2    vitamin D (ERGOCALCIFEROL) 1.25 MG (16193 UT) capsule capsule Take one tablet once a week for 4 weeks then take one tablet once a month 6 capsule 3     No current facility-administered medications for this visit.       Allegra-d [fexofenadine-pseudoephed er], Macrobid [nitrofurantoin monohyd macro], and Penicillins    Past Medical History:   Diagnosis Date    Asthma     Depression     Hx of cholecystectomy     Osteopenia     Palpitation     Seasonal allergies     Vitamin D deficiency        Social History     Socioeconomic History    Marital status:    Tobacco Use    Smoking status: Former     Current packs/day: 0.00     Types: Cigarettes     Quit date:      Years since quittin.2    Smokeless tobacco: Never   Vaping Use    Vaping status: Never Used   Substance and Sexual Activity    Alcohol use: No    Drug use: No       Family History   Problem Relation Age of Onset    Irregular heart beat Mother     Prostate cancer Father     Heart disease Other        Review of Systems   Constitutional: Positive for weight gain.   Cardiovascular:  Positive for chest pain (atypical, non exertional). Negative for irregular heartbeat, leg swelling, near-syncope and palpitations.   Respiratory:  Negative for shortness of breath.    Hematologic/Lymphatic: Negative for bleeding problem.   Skin:  Negative for color change.        BP Readings from Last 5 Encounters:   24 142/72   23 130/70   23 122/72   22 142/82   21 138/70       Wt Readings from Last 5 Encounters:   24 86.7 kg (191 lb 3.2 oz)   23 84.1 kg (185 lb 6.4 oz)   23 83.6 kg (184 lb 6.4 oz)   22 79.8 kg (176 lb)   21 80.8 kg (178 lb 3.2 oz)       Objective     Labs 3/7/2024 per PCP: Vitamin D28.2, vitamin B12 457, folic acid 9.6, TSH 3.12, sed rate 4, total cholesterol 190 prior 192, triglycerides 98 prior 82, HDL 49 prior 52,  prior  "125, A1c 5.6, CBC in normal range, CMP in normal range    /72 (BP Location: Left arm, Patient Position: Sitting)   Pulse 64   Ht 162.6 cm (64\")   Wt 86.7 kg (191 lb 3.2 oz)   BMI 32.82 kg/m²     Vitals and nursing note reviewed.   Constitutional:       Appearance: Healthy appearance. Not in distress.   Eyes:      Conjunctiva/sclera: Conjunctivae normal.      Pupils: Pupils are equal, round, and reactive to light.   HENT:      Head: Normocephalic.   Pulmonary:      Effort: Pulmonary effort is normal.      Breath sounds: Normal breath sounds.   Cardiovascular:      PMI at left midclavicular line. Normal rate. Regular rhythm.   Edema:     Peripheral edema absent.   Abdominal:      General: Bowel sounds are normal.      Palpations: Abdomen is soft.   Musculoskeletal: Normal range of motion.      Cervical back: Normal range of motion and neck supple. Skin:     General: Skin is warm and dry.   Neurological:      Mental Status: Alert, oriented to person, place, and time and oriented to person, place and time.          Procedures: None today         Assessment & Plan   Diagnoses and all orders for this visit:    1. Primary hypertension (Primary)    2. Elevated LDL cholesterol level    3. Nonrheumatic mitral valve regurgitation    4. Obesity (BMI 30.0-34.9)    Other orders  -     vitamin D (ERGOCALCIFEROL) 1.25 MG (90419 UT) capsule capsule; Take one tablet once a week for 4 weeks then take one tablet once a month  Dispense: 6 capsule; Refill: 3      Hypertension/palpitations  -BP controlled  -Heart rate and rhythm normal  -Currently palpitations denied  -Informational handout on palpitations and triggers to avoid provided  -Continue Lopressor 1 tablet in the morning and one half in the evening.  Can take 1/2 tablet as needed for palpitations    Chest pressure  -Nonexertional and infrequent  -Patient request to postpone repeat cardiac workup but agrees to call should symptoms worsen or new problems develop.   "   Hyperlipidemia  -Recent labs reviewed.  LDL similar to prior in the 120s.  HDL 49.  -Continue diet efforts.  We discussed dietary plans.  Informational handout provided.  -If trend remains in the 120s or increases then consider low-dose statin therapy.     Cardiac murmur  -Clinically stable  -No repeat testing warranted     BMI 32  -Diet and weight loss efforts with goal BMI goal around 25 encouraged.     Low vitamin D  -Supplement prescribed    Annual follow-up visit scheduled.                 Electronically signed by SAUL Jensen,  March 29, 2024 10:49 EDT    Dictated Utilizing Dragon Dictation: Part of this note may be an electronic transcription/translation of spoken language to printed text using the Dragon Dictation System.

## 2025-03-27 ENCOUNTER — OFFICE VISIT (OUTPATIENT)
Dept: CARDIOLOGY | Facility: CLINIC | Age: 70
End: 2025-03-27
Payer: MEDICARE

## 2025-03-27 VITALS
WEIGHT: 196.04 LBS | SYSTOLIC BLOOD PRESSURE: 130 MMHG | HEART RATE: 70 BPM | HEIGHT: 64 IN | DIASTOLIC BLOOD PRESSURE: 74 MMHG | BODY MASS INDEX: 33.47 KG/M2

## 2025-03-27 DIAGNOSIS — E78.00 ELEVATED LDL CHOLESTEROL LEVEL: ICD-10-CM

## 2025-03-27 DIAGNOSIS — R00.2 PALPITATION: ICD-10-CM

## 2025-03-27 DIAGNOSIS — E66.811 OBESITY (BMI 30.0-34.9): ICD-10-CM

## 2025-03-27 DIAGNOSIS — I10 PRIMARY HYPERTENSION: Primary | ICD-10-CM

## 2025-03-27 DIAGNOSIS — E55.9 VITAMIN D DEFICIENCY: ICD-10-CM

## 2025-03-27 DIAGNOSIS — K21.9 GASTROESOPHAGEAL REFLUX DISEASE WITHOUT ESOPHAGITIS: ICD-10-CM

## 2025-03-27 PROCEDURE — 1160F RVW MEDS BY RX/DR IN RCRD: CPT | Performed by: NURSE PRACTITIONER

## 2025-03-27 PROCEDURE — 1159F MED LIST DOCD IN RCRD: CPT | Performed by: NURSE PRACTITIONER

## 2025-03-27 PROCEDURE — 3078F DIAST BP <80 MM HG: CPT | Performed by: NURSE PRACTITIONER

## 2025-03-27 PROCEDURE — 3075F SYST BP GE 130 - 139MM HG: CPT | Performed by: NURSE PRACTITIONER

## 2025-03-27 PROCEDURE — 99214 OFFICE O/P EST MOD 30 MIN: CPT | Performed by: NURSE PRACTITIONER

## 2025-03-27 RX ORDER — PANTOPRAZOLE SODIUM 40 MG/1
40 TABLET, DELAYED RELEASE ORAL DAILY
Qty: 90 TABLET | Refills: 2 | Status: SHIPPED | OUTPATIENT
Start: 2025-03-27

## 2025-03-27 RX ORDER — METOPROLOL TARTRATE 25 MG/1
TABLET, FILM COATED ORAL
Qty: 180 TABLET | Refills: 2 | Status: SHIPPED | OUTPATIENT
Start: 2025-03-27

## 2025-03-27 NOTE — PROGRESS NOTES
"Chief Complaint   Patient presents with    Follow-up     Cardiac management    LABS     Has routine labs per PCP , reports her vitamin D was still low , she was advised to take roel D and calcium     Med Refill     Needs refills on Protonix and metoprolol 90 day supply to  [facility]    Palpitations     Patient reports feeling palpitations , she described  as \" feels like bubbles \"        Subjective       Tawanna Montgomery is a 69 y.o. female with palpitations, HTN, murmur, allergies, and asthma. Palpitations have been on and off for the last several years. Holter showed a baseline sinus rhythm with highest rate of 114. Beta blockers were started and she underwent cardiac investigation. Echo showed normal EF, mild LVH, and moderate MR.      Today she returns to the office for follow-up visit.  Chest pain, increase shortness of breath, and dizziness denied.  Occasionally she will feel palpitations, currently nonconcerning.  Blood pressure remained stable.  Her main concern has been inability to lose weight over the last year.  She has not resumed exercising after knee replacement surgery in 2023.  Recently she has consider joining [employer] sne[employer] program.    Cardiac History:    Past Surgical History:   Procedure Laterality Date    CARDIOVASCULAR STRESS TEST  07/30/2015    Stress-8min, 82%THR, 150/72, negative for ischemia    CONVERTED (HISTORICAL) HOLTER  02/13/2015    Holter-baseline sinus, HR     ECHO - CONVERTED  07/30/2015    Echo-Ef 60-65%, moderate MR, RVSP-20mmHg.    ECHO - CONVERTED  09/21/2020    EF 60%. LA- 3.7 Cm. Mild MR    EYE SURGERY         Current Outpatient Medications   Medication Sig Dispense Refill    Calcium Carbonate-Vitamin D (CALTRATE 600+D PO) Take 1 tablet by mouth Daily.      clindamycin (CLEOCIN) 300 MG capsule Take 1 capsule by mouth 3 (Three) Times a Day. Take 2 hours prior to dental work      escitalopram (LEXAPRO) 10 MG tablet Take 1 tablet by mouth Daily.      meloxicam (MOBIC) 15 " MG tablet Take 1 tablet by mouth As Needed.      metoprolol tartrate (LOPRESSOR) 25 MG tablet 1 tablet in AM and may use additional 1/2 to 1 tablet PM for palpitations 180 tablet 2    pantoprazole (PROTONIX) 40 MG EC tablet Take 1 tablet by mouth Daily. 90 tablet 2     No current facility-administered medications for this visit.       Allegra-d [fexofenadine-pseudoephed er], Macrobid [nitrofurantoin monohyd macro], and Penicillins    Past Medical History:   Diagnosis Date    Asthma     Depression     Hx of cholecystectomy     Osteopenia     Palpitation     Seasonal allergies     Vitamin D deficiency        Social History     Socioeconomic History    Marital status:    Tobacco Use    Smoking status: Former     Current packs/day: 0.00     Types: Cigarettes     Quit date:      Years since quittin.2    Smokeless tobacco: Never   Vaping Use    Vaping status: Never Used   Substance and Sexual Activity    Alcohol use: No    Drug use: No       Family History   Problem Relation Age of Onset    Irregular heart beat Mother     Prostate cancer Father     Heart disease Other        Review of Systems   Constitutional: Positive for weight gain. Negative for diaphoresis.   Cardiovascular:  Positive for palpitations (Brief, infrequent). Negative for chest pain, dyspnea on exertion and leg swelling.   Respiratory:  Negative for shortness of breath.    Endocrine: Negative for polydipsia, polyphagia and polyuria.   Hematologic/Lymphatic: Negative for bleeding problem.   Skin:  Negative for color change.   Musculoskeletal:  Positive for arthritis, joint pain and stiffness. Negative for falls.   Neurological:  Negative for dizziness and weakness.   Psychiatric/Behavioral:  Negative for memory loss.         BP Readings from Last 5 Encounters:   25 130/74   24 142/72   23 130/70   23 122/72   22 142/82       Wt Readings from Last 5 Encounters:   25 88.9 kg (196 lb 0.6 oz)   24 86.7  "kg (191 lb 3.2 oz)   08/07/23 84.1 kg (185 lb 6.4 oz)   01/30/23 83.6 kg (184 lb 6.4 oz)   07/19/22 79.8 kg (176 lb)       Objective     /74 (BP Location: Left arm, Patient Position: Sitting, Cuff Size: Adult)   Pulse 70   Ht 162.6 cm (64\")   Wt 88.9 kg (196 lb 0.6 oz)   BMI 33.65 kg/m²     Vitals and nursing note reviewed.   Constitutional:       Appearance: Healthy appearance. Not in distress.   Eyes:      Conjunctiva/sclera: Conjunctivae normal.      Pupils: Pupils are equal, round, and reactive to light.   HENT:      Head: Normocephalic.   Pulmonary:      Effort: Pulmonary effort is normal.      Breath sounds: Normal breath sounds.   Cardiovascular:      PMI at left midclavicular line. Normal rate. Regular rhythm.      Murmurs: There is no murmur.   Edema:     Peripheral edema absent.   Abdominal:      General: Bowel sounds are normal.      Palpations: Abdomen is soft.   Musculoskeletal: Normal range of motion.      Cervical back: Normal range of motion and neck supple. Skin:     General: Skin is warm and dry.   Neurological:      Mental Status: Alert, oriented to person, place, and time and oriented to person, place and time.          Procedures: None today         Assessment & Plan   Diagnoses and all orders for this visit:    1. Primary hypertension (Primary)  -     metoprolol tartrate (LOPRESSOR) 25 MG tablet; 1 tablet in AM and may use additional 1/2 to 1 tablet PM for palpitations  Dispense: 180 tablet; Refill: 2    2. Palpitation    3. Elevated LDL cholesterol level    4. Gastroesophageal reflux disease without esophagitis  -     pantoprazole (PROTONIX) 40 MG EC tablet; Take 1 tablet by mouth Daily.  Dispense: 90 tablet; Refill: 2    5. Vitamin D deficiency    6. Obesity (BMI 30.0-34.9)        Hypertension/palpitations  -BP controlled  -Heart rate and rhythm normal  -Denies recurrence of chest pressure.  - Admits to occasional palpitations without associated symptoms, currently " nonconcerning.  -Continue Lopressor 1 tablet in the morning and one half in the evening.  Can take 1/2 tablet as needed for palpitations      Hyperlipidemia  -Labs per PCP  -If lipid trend remains slightly elevated or increase then consider low-dose statin  -Encouraged on diet and weight loss      BMI 33  - Informational handout on BMI and diet provided.  -Patient agrees to try plate method with half a plate green leafy vegetables or similar, fourth plate protein, and 1/4 grains or healthy carbs.  -Routine exercise encouraged.  We discussed benefit of Silver sneakers.     Low vitamin D  - According to patient most recent vitamin D level only increased to 29.  She is taking OTC supplement, currently K2 included.  -Informational handouts on vitamin D deficiency and management provided.     Annual follow-up visit scheduled.              Electronically signed by SAUL Jensen,  March 27, 2025 14:45 EDT    Dictated Utilizing Dragon Dictation: Part of this note may be an electronic transcription/translation of spoken language to printed text using the Dragon Dictation System.